# Patient Record
Sex: MALE | Race: OTHER | Employment: FULL TIME | URBAN - NONMETROPOLITAN AREA
[De-identification: names, ages, dates, MRNs, and addresses within clinical notes are randomized per-mention and may not be internally consistent; named-entity substitution may affect disease eponyms.]

---

## 2021-08-26 ENCOUNTER — OFFICE VISIT (OUTPATIENT)
Dept: FAMILY MEDICINE CLINIC | Age: 31
End: 2021-08-26
Payer: COMMERCIAL

## 2021-08-26 VITALS
DIASTOLIC BLOOD PRESSURE: 86 MMHG | TEMPERATURE: 97.3 F | RESPIRATION RATE: 16 BRPM | WEIGHT: 165.8 LBS | HEIGHT: 69 IN | BODY MASS INDEX: 24.56 KG/M2 | OXYGEN SATURATION: 98 % | HEART RATE: 86 BPM | SYSTOLIC BLOOD PRESSURE: 122 MMHG

## 2021-08-26 DIAGNOSIS — Z00.00 ENCOUNTER FOR WELLNESS EXAMINATION IN ADULT: ICD-10-CM

## 2021-08-26 DIAGNOSIS — B35.4 TINEA CORPORIS: ICD-10-CM

## 2021-08-26 DIAGNOSIS — H61.21 IMPACTED CERUMEN OF RIGHT EAR: Primary | ICD-10-CM

## 2021-08-26 PROCEDURE — 99203 OFFICE O/P NEW LOW 30 MIN: CPT | Performed by: STUDENT IN AN ORGANIZED HEALTH CARE EDUCATION/TRAINING PROGRAM

## 2021-08-26 RX ORDER — CLOTRIMAZOLE 1 %
CREAM (GRAM) TOPICAL
Qty: 1 TUBE | Refills: 1 | Status: SHIPPED | OUTPATIENT
Start: 2021-08-26 | End: 2021-09-02

## 2021-08-26 SDOH — ECONOMIC STABILITY: FOOD INSECURITY: WITHIN THE PAST 12 MONTHS, THE FOOD YOU BOUGHT JUST DIDN'T LAST AND YOU DIDN'T HAVE MONEY TO GET MORE.: NEVER TRUE

## 2021-08-26 SDOH — ECONOMIC STABILITY: FOOD INSECURITY: WITHIN THE PAST 12 MONTHS, YOU WORRIED THAT YOUR FOOD WOULD RUN OUT BEFORE YOU GOT MONEY TO BUY MORE.: NEVER TRUE

## 2021-08-26 ASSESSMENT — PATIENT HEALTH QUESTIONNAIRE - PHQ9
2. FEELING DOWN, DEPRESSED OR HOPELESS: 0
SUM OF ALL RESPONSES TO PHQ9 QUESTIONS 1 & 2: 0
SUM OF ALL RESPONSES TO PHQ QUESTIONS 1-9: 0
SUM OF ALL RESPONSES TO PHQ QUESTIONS 1-9: 0
1. LITTLE INTEREST OR PLEASURE IN DOING THINGS: 0
SUM OF ALL RESPONSES TO PHQ QUESTIONS 1-9: 0

## 2021-08-26 ASSESSMENT — ENCOUNTER SYMPTOMS
CONSTIPATION: 0
DIARRHEA: 0
COUGH: 0
ABDOMINAL PAIN: 0
EYE PAIN: 0
TROUBLE SWALLOWING: 0
BLOOD IN STOOL: 0
SHORTNESS OF BREATH: 0

## 2021-08-26 ASSESSMENT — SOCIAL DETERMINANTS OF HEALTH (SDOH): HOW HARD IS IT FOR YOU TO PAY FOR THE VERY BASICS LIKE FOOD, HOUSING, MEDICAL CARE, AND HEATING?: NOT HARD AT ALL

## 2021-08-26 NOTE — PROGRESS NOTES
Gomez Nava is a 32 y.o. male who presents today for:  Chief Complaint   Patient presents with   Darlean Romberg Doctor     trouble hearing out of Right ear, having drainage and abnormal odor to drainage started a couple weeks ago also having umbilical pain and redness started a couple days ago     HPI:   Gomez Nava is 32 y.o. who presents today for establishing care and evaluation of decreased hearing in the right ear as well as redness in his umbilicus. Hearing decreased x2 weeks, no trauma. Thinks it is due to ear wax. No past medical history. States he smokes 2 cigarettes a day, occasionally drinks 1 can of beer per night. No other concerns.  with a 5year old son and 1 week infant. Questions about COVID vaccine. Objective:     Vitals:    08/26/21 0954   BP: 122/86   Site: Right Upper Arm   Position: Sitting   Cuff Size: Medium Adult   Pulse: 86   Resp: 16   Temp: 97.3 °F (36.3 °C)   TempSrc: Temporal   SpO2: 98%   Weight: 165 lb 12.8 oz (75.2 kg)   Height: 5' 8.7\" (1.745 m)       Wt Readings from Last 3 Encounters:   08/26/21 165 lb 12.8 oz (75.2 kg)       BP Readings from Last 3 Encounters:   08/26/21 122/86       Review of Systems   Constitutional: Negative for chills, fatigue and fever. HENT: Positive for hearing loss (right ear x2 weeks). Negative for congestion, ear pain, postnasal drip and trouble swallowing. Eyes: Negative for pain and visual disturbance. Respiratory: Negative for cough and shortness of breath. Cardiovascular: Negative for chest pain and palpitations. Gastrointestinal: Negative for abdominal pain, blood in stool, constipation and diarrhea. Genitourinary: Negative for dysuria and hematuria. Skin: Negative for rash and wound. Neurological: Negative for dizziness and headaches. Psychiatric/Behavioral: The patient is not nervous/anxious. Physical Exam  Vitals and nursing note reviewed.    Constitutional:       General: He is not in acute distress. Appearance: He is well-developed. He is not diaphoretic. HENT:      Head: Normocephalic and atraumatic. Right Ear: Ear canal and external ear normal. There is impacted cerumen. Left Ear: Tympanic membrane, ear canal and external ear normal. There is no impacted cerumen. Nose: Nose normal.   Eyes:      General: No scleral icterus. Right eye: No discharge. Left eye: No discharge. Conjunctiva/sclera: Conjunctivae normal.   Cardiovascular:      Rate and Rhythm: Normal rate and regular rhythm. Heart sounds: Normal heart sounds. No murmur heard. Pulmonary:      Effort: Pulmonary effort is normal.      Breath sounds: Normal breath sounds. Abdominal:      Palpations: Abdomen is soft. Tenderness: There is no abdominal tenderness. Musculoskeletal:      Cervical back: Normal range of motion. Skin:     General: Skin is warm and dry. Findings: Lesion (red, macerated lesion 0.5 cm inside umbilicus, no surrounding erythema or discharge) present. No erythema or rash. Neurological:      Mental Status: He is alert and oriented to person, place, and time. Psychiatric:         Mood and Affect: Mood normal.         Behavior: Behavior normal.         Thought Content: Thought content normal.         Judgment: Judgment normal.           There is no immunization history on file for this patient. Health Maintenance Due   Topic Date Due    Hepatitis C screen  Never done    COVID-19 Vaccine (1) Never done    HIV screen  Never done    DTaP/Tdap/Td vaccine (1 - Tdap) Never done          Food Insecurity: No Food Insecurity    Worried About Running Out of Food in the Last Year: Never true    Ran Out of Food in the Last Year: Never true       Assessment / Plan:      Diagnosis Orders   1. Impacted cerumen of right ear     2. Tinea corporis  clotrimazole (LOTRIMIN AF) 1 % cream   3.  Encounter for wellness examination in adult  CBC Auto Differential Comprehensive Metabolic Panel     Offered flushing today - patient declined. Will do debrox OTC. F/u in 2 weeks if no improvement for flushing. Apply lotrimin to umbilicus BID until resolved. Call back if worsening or development of fever/chills. Check CBC and CMP prior to next appt       Preventative Medicine   AAA ultrasound (Male, 65-75, smoked ever) indicated at this time? Will need at age 72  Encouraged TDAP  Encouraged COVID vaccine        Return in about 2 weeks (around 9/9/2021) for f/u on hearing. Medications Prescribed:  Orders Placed This Encounter   Medications    clotrimazole (LOTRIMIN AF) 1 % cream     Sig: Apply topically 2 times daily. Dispense:  1 Tube     Refill:  1       No future appointments. Patient given educational materials - see patient instructions. Discussed use, benefit, and sideeffects of prescribed medications. All patient questions answered. Pt voiced understanding. Reviewed health maintenance. Instructed to continue current medications, diet and exercise. Patient agreed with treatment plan. Follow up as directed.      Electronically signed by Cuhn Downs DO on 8/26/2021 at 10:34 AM

## 2021-08-26 NOTE — PROGRESS NOTES
Health Maintenance Due   Topic Date Due    Hepatitis C screen  Never done    Varicella vaccine (1 of 2 - 2-dose childhood series) Never done    COVID-19 Vaccine (1) Never done    HIV screen  Never done    DTaP/Tdap/Td vaccine (1 - Tdap) Never done

## 2021-08-26 NOTE — PROGRESS NOTES
S: 32 y.o. male with   Chief Complaint   Patient presents with   Callie Valente     trouble hearing out of Right ear, having drainage and abnormal odor to drainage started a couple weeks ago also having umbilical pain and redness started a couple days ago       HPI: please see resident note for HPI and ROS. BP Readings from Last 3 Encounters:   08/26/21 122/86     Wt Readings from Last 3 Encounters:   08/26/21 165 lb 12.8 oz (75.2 kg)       O: VS:  height is 5' 8.7\" (1.745 m) and weight is 165 lb 12.8 oz (75.2 kg). His temporal temperature is 97.3 °F (36.3 °C). His blood pressure is 122/86 and his pulse is 86. His respiration is 16 and oxygen saturation is 98%. Diagnosis Orders   1. Impacted cerumen of right ear     2. Tinea corporis  clotrimazole (LOTRIMIN AF) 1 % cream   3. Encounter for wellness examination in adult  CBC Auto Differential    Comprehensive Metabolic Panel       Plan:  Start debrox at home as dir. Start lotrimin cream. Labs as ordered. Encouraged to get tdap and covid vaccines. F/u if ear not improving. 1 yr for well exam.     Health Maintenance Due   Topic Date Due    Hepatitis C screen  Never done    COVID-19 Vaccine (1) Never done    HIV screen  Never done    DTaP/Tdap/Td vaccine (1 - Tdap) Never done       Attending Physician Statement  I have discussed the case, including pertinent history and exam findings with the resident. I agree with the documented assessment and plan as documented by the resident.         Adalberto Wagoner DO 8/26/2021 10:27 AM

## 2021-08-26 NOTE — PATIENT INSTRUCTIONS
Take Debrox over the counter for softening ear wax, return if no improvement for ear flushing. Thank you   1. Thank you for trusting us with your healthcare needs. You may receive a survey regarding today's visit. It would help us out if you would take a few moments to provide your feedback. We value your input. 2. Please bring in ALL medications BOTTLES, including inhalers, herbal supplements, over the counter, prescribed & non-prescribed medicine. The office would like actual medication bottles and a list.   3. Please note our OFFICE POLICIES:   a. Prior to getting your labs drawn, please check with your insurance company for benefits and eligibility of lab services. Often, insurance companies cover certain tests for preventative visits only. It is patient's responsibility to see what is covered. b. We are unable to change a diagnosis after the test has been performed. c. Lab orders will not be re-printed. Please hold onto your original lab orders and take them to your lab to be completed. d. If you no show your scheduled appointment three times, you will be dismissed from this practice. e. Merle Bull must be completed 24 hours prior to your schedule appointment. 4. If the list below has been completed, PLEASE FAX RECORDS TO OUR OFFICE @ [phone].  Once the records have been received we will update your records at our office:  Health Maintenance Due   Topic Date Due    Hepatitis C screen  Never done    Varicella vaccine (1 of 2 - 2-dose childhood series) Never done    COVID-19 Vaccine (1) Never done    HIV screen  Never done    DTaP/Tdap/Td vaccine (1 - Tdap) Never done           Tobacco Cessation Programs     Telephonic behavior modification   1-800-QUIT-NOW (879-0874)   Counseling service for those who are ready to quit using tobacco.     Available for uninsured [place] residents, [place] recipients, pregnant women, or patients whose health plans or employers are members of the [place] [employer]    Online behavior modification   http://[place]. Quitlogix. org   Online support program to help patients through each step of the quitting process. Available 24 hours a day 7 days a week. Provides up to date researched based tool, step-by-step guides, and motivational messages. Online behavior modification   www.lungusa.org/stop-smoking/how-to-quit   HelpLine: [facility] (381-9455)   Email questions to:  Bonifacio@[email or web address] org    Website offers resources to help tobacco users figure out their reasons for quitting and then take the big step of quitting for good. Hypnosis   Location: [address] [place] [address]   Contact: Shahida Zamorano, PhD at [phone]   Hypnosis for tobacco cessation   Cost $225 for the initial session and $175 for each session afterwards. Most patients require 6-8 sessions. There is the option to submit through the patients insurance. Hypnosis and behavior modification   Location: [place] [address],  [address], [place]   Contact: Keon Hall, PhD at [phone]  [facility] Counseling and hypnosis for nicotine addition   Cost: For uninsured patients:  Please call above phone number  Cost for insured patients depends on patients insurance plan. Behavior modification   Location: [facility], [address] [place] [address]   Contact: Benito May include four one-on-one appointments between the patient and a respiratory therapist.  The four appointments span over three weeks. The respiratory therapist schedules one of the appointments to occur 48 hours after the patients quit date.  Cost $100 total for the four sessions.   Tobacco cessation products are not included in the cost and are not provided by [facility].

## 2021-09-21 ENCOUNTER — TELEPHONE (OUTPATIENT)
Dept: FAMILY MEDICINE CLINIC | Age: 31
End: 2021-09-21

## 2021-09-21 DIAGNOSIS — Z01.89 ENCOUNTER FOR LABORATORY TEST: Primary | ICD-10-CM

## 2021-09-21 NOTE — TELEPHONE ENCOUNTER
Ordered. Please call and let him know that he will need to tell them to draw all previously ordered labs (CBC, CMP) with the new tests.      [Sometimes the lab only draws the most recently ordered things.]

## 2021-09-21 NOTE — TELEPHONE ENCOUNTER
We discussed these and he declined testing previously. Can you call and ask if he wants to be checked for these? If so, let me know and I will order.

## 2022-03-27 ENCOUNTER — HOSPITAL ENCOUNTER (EMERGENCY)
Age: 32
Discharge: HOME | End: 2022-03-27
Payer: MEDICAID

## 2022-03-27 ENCOUNTER — APPOINTMENT (OUTPATIENT)
Dept: CT IMAGING | Age: 32
End: 2022-03-27
Payer: MEDICAID

## 2022-03-27 VITALS
BODY MASS INDEX: 25.76 KG/M2 | WEIGHT: 170 LBS | OXYGEN SATURATION: 97 % | SYSTOLIC BLOOD PRESSURE: 147 MMHG | DIASTOLIC BLOOD PRESSURE: 78 MMHG | RESPIRATION RATE: 18 BRPM | HEIGHT: 68 IN | HEART RATE: 97 BPM | TEMPERATURE: 98.6 F

## 2022-03-27 DIAGNOSIS — R10.13 DYSPEPSIA: ICD-10-CM

## 2022-03-27 DIAGNOSIS — R19.7 NAUSEA VOMITING AND DIARRHEA: Primary | ICD-10-CM

## 2022-03-27 DIAGNOSIS — R11.2 NAUSEA VOMITING AND DIARRHEA: Primary | ICD-10-CM

## 2022-03-27 DIAGNOSIS — K52.9 COLITIS: ICD-10-CM

## 2022-03-27 LAB
ALBUMIN SERPL-MCNC: 4.6 G/DL (ref 3.5–5.1)
ALP BLD-CCNC: 62 U/L (ref 38–126)
ALT SERPL-CCNC: 41 U/L (ref 11–66)
ANION GAP SERPL CALCULATED.3IONS-SCNC: 11 MEQ/L (ref 8–16)
AST SERPL-CCNC: 37 U/L (ref 5–40)
BACTERIA: ABNORMAL
BASOPHILS # BLD: 0.1 %
BASOPHILS ABSOLUTE: 0 THOU/MM3 (ref 0–0.1)
BILIRUB SERPL-MCNC: 1.1 MG/DL (ref 0.3–1.2)
BILIRUBIN DIRECT: < 0.2 MG/DL (ref 0–0.3)
BILIRUBIN URINE: NEGATIVE
BLOOD, URINE: ABNORMAL
BUN BLDV-MCNC: 18 MG/DL (ref 7–22)
CALCIUM SERPL-MCNC: 9.5 MG/DL (ref 8.5–10.5)
CASTS: ABNORMAL /LPF
CASTS: ABNORMAL /LPF
CHARACTER, URINE: CLEAR
CHLORIDE BLD-SCNC: 99 MEQ/L (ref 98–111)
CO2: 24 MEQ/L (ref 23–33)
COLOR: YELLOW
CREAT SERPL-MCNC: 1 MG/DL (ref 0.4–1.2)
CRYSTALS: ABNORMAL
EOSINOPHIL # BLD: 0.1 %
EOSINOPHILS ABSOLUTE: 0 THOU/MM3 (ref 0–0.4)
EPITHELIAL CELLS, UA: ABNORMAL /HPF
ERYTHROCYTE [DISTWIDTH] IN BLOOD BY AUTOMATED COUNT: 12.7 % (ref 11.5–14.5)
ERYTHROCYTE [DISTWIDTH] IN BLOOD BY AUTOMATED COUNT: 43.7 FL (ref 35–45)
GFR SERPL CREATININE-BSD FRML MDRD: 87 ML/MIN/1.73M2
GLUCOSE BLD-MCNC: 127 MG/DL (ref 70–108)
GLUCOSE, URINE: NEGATIVE MG/DL
HCT VFR BLD CALC: 47.6 % (ref 42–52)
HEMOGLOBIN: 15.8 GM/DL (ref 14–18)
IMMATURE GRANS (ABS): 0.07 THOU/MM3 (ref 0–0.07)
IMMATURE GRANULOCYTES: 0.5 %
KETONES, URINE: ABNORMAL
LEUKOCYTE ESTERASE, URINE: NEGATIVE
LIPASE: 23.3 U/L (ref 5.6–51.3)
LYMPHOCYTES # BLD: 15.2 %
LYMPHOCYTES ABSOLUTE: 2.2 THOU/MM3 (ref 1–4.8)
MCH RBC QN AUTO: 30.8 PG (ref 26–33)
MCHC RBC AUTO-ENTMCNC: 33.2 GM/DL (ref 32.2–35.5)
MCV RBC AUTO: 92.8 FL (ref 80–94)
MISCELLANEOUS LAB TEST RESULT: ABNORMAL
MONOCYTES # BLD: 2.6 %
MONOCYTES ABSOLUTE: 0.4 THOU/MM3 (ref 0.4–1.3)
NITRITE, URINE: NEGATIVE
NUCLEATED RED BLOOD CELLS: 0 /100 WBC
OSMOLALITY CALCULATION: 271.7 MOSMOL/KG (ref 275–300)
PH UA: 5.5 (ref 5–9)
PLATELET # BLD: 272 THOU/MM3 (ref 130–400)
PMV BLD AUTO: 9.9 FL (ref 9.4–12.4)
POTASSIUM SERPL-SCNC: 3.8 MEQ/L (ref 3.5–5.2)
PROTEIN UA: NEGATIVE MG/DL
RBC # BLD: 5.13 MILL/MM3 (ref 4.7–6.1)
RBC URINE: ABNORMAL /HPF
RENAL EPITHELIAL, UA: ABNORMAL
SEG NEUTROPHILS: 81.5 %
SEGMENTED NEUTROPHILS ABSOLUTE COUNT: 11.7 THOU/MM3 (ref 1.8–7.7)
SODIUM BLD-SCNC: 134 MEQ/L (ref 135–145)
SPECIFIC GRAVITY UA: 1.02 (ref 1–1.03)
TOTAL PROTEIN: 7.6 G/DL (ref 6.1–8)
UROBILINOGEN, URINE: 0.2 EU/DL (ref 0–1)
WBC # BLD: 14.3 THOU/MM3 (ref 4.8–10.8)
WBC UA: ABNORMAL /HPF
YEAST: ABNORMAL

## 2022-03-27 PROCEDURE — 96374 THER/PROPH/DIAG INJ IV PUSH: CPT

## 2022-03-27 PROCEDURE — 83690 ASSAY OF LIPASE: CPT

## 2022-03-27 PROCEDURE — 80053 COMPREHEN METABOLIC PANEL: CPT

## 2022-03-27 PROCEDURE — 74177 CT ABD & PELVIS W/CONTRAST: CPT

## 2022-03-27 PROCEDURE — 96375 TX/PRO/DX INJ NEW DRUG ADDON: CPT

## 2022-03-27 PROCEDURE — 99285 EMERGENCY DEPT VISIT HI MDM: CPT

## 2022-03-27 PROCEDURE — 85025 COMPLETE CBC W/AUTO DIFF WBC: CPT

## 2022-03-27 PROCEDURE — 6360000002 HC RX W HCPCS: Performed by: NURSE PRACTITIONER

## 2022-03-27 PROCEDURE — 6360000004 HC RX CONTRAST MEDICATION: Performed by: NURSE PRACTITIONER

## 2022-03-27 PROCEDURE — 81001 URINALYSIS AUTO W/SCOPE: CPT

## 2022-03-27 PROCEDURE — C9113 INJ PANTOPRAZOLE SODIUM, VIA: HCPCS | Performed by: NURSE PRACTITIONER

## 2022-03-27 PROCEDURE — 2580000003 HC RX 258: Performed by: NURSE PRACTITIONER

## 2022-03-27 PROCEDURE — 82248 BILIRUBIN DIRECT: CPT

## 2022-03-27 PROCEDURE — 6370000000 HC RX 637 (ALT 250 FOR IP): Performed by: NURSE PRACTITIONER

## 2022-03-27 RX ORDER — AMOXICILLIN AND CLAVULANATE POTASSIUM 875; 125 MG/1; MG/1
1 TABLET, FILM COATED ORAL 2 TIMES DAILY
Qty: 20 TABLET | Refills: 0 | Status: SHIPPED | OUTPATIENT
Start: 2022-03-27 | End: 2022-03-29

## 2022-03-27 RX ORDER — ONDANSETRON 4 MG/1
4 TABLET, ORALLY DISINTEGRATING ORAL 3 TIMES DAILY PRN
Qty: 21 TABLET | Refills: 0 | Status: SHIPPED | OUTPATIENT
Start: 2022-03-27

## 2022-03-27 RX ORDER — HYDROCODONE BITARTRATE AND ACETAMINOPHEN 5; 325 MG/1; MG/1
1 TABLET ORAL EVERY 6 HOURS PRN
Qty: 10 TABLET | Refills: 0 | Status: SHIPPED | OUTPATIENT
Start: 2022-03-27 | End: 2022-03-29

## 2022-03-27 RX ORDER — PANTOPRAZOLE SODIUM 40 MG/10ML
40 INJECTION, POWDER, LYOPHILIZED, FOR SOLUTION INTRAVENOUS ONCE
Status: COMPLETED | OUTPATIENT
Start: 2022-03-27 | End: 2022-03-27

## 2022-03-27 RX ORDER — MORPHINE SULFATE 2 MG/ML
4 INJECTION, SOLUTION INTRAMUSCULAR; INTRAVENOUS ONCE
Status: COMPLETED | OUTPATIENT
Start: 2022-03-27 | End: 2022-03-27

## 2022-03-27 RX ORDER — ONDANSETRON 2 MG/ML
4 INJECTION INTRAMUSCULAR; INTRAVENOUS ONCE
Status: COMPLETED | OUTPATIENT
Start: 2022-03-27 | End: 2022-03-27

## 2022-03-27 RX ORDER — MAGNESIUM HYDROXIDE/ALUMINUM HYDROXICE/SIMETHICONE 120; 1200; 1200 MG/30ML; MG/30ML; MG/30ML
5 SUSPENSION ORAL EVERY 6 HOURS PRN
Qty: 355 ML | Refills: 0 | Status: ON HOLD | OUTPATIENT
Start: 2022-03-27 | End: 2022-03-30 | Stop reason: HOSPADM

## 2022-03-27 RX ORDER — OMEPRAZOLE 20 MG/1
20 CAPSULE, DELAYED RELEASE ORAL
Qty: 30 CAPSULE | Refills: 0 | Status: SHIPPED | OUTPATIENT
Start: 2022-03-27

## 2022-03-27 RX ORDER — 0.9 % SODIUM CHLORIDE 0.9 %
1000 INTRAVENOUS SOLUTION INTRAVENOUS ONCE
Status: COMPLETED | OUTPATIENT
Start: 2022-03-27 | End: 2022-03-27

## 2022-03-27 RX ADMIN — IOPAMIDOL 80 ML: 755 INJECTION, SOLUTION INTRAVENOUS at 14:47

## 2022-03-27 RX ADMIN — SODIUM CHLORIDE 1000 ML: 9 INJECTION, SOLUTION INTRAVENOUS at 12:23

## 2022-03-27 RX ADMIN — MORPHINE SULFATE 4 MG: 2 INJECTION, SOLUTION INTRAMUSCULAR; INTRAVENOUS at 14:35

## 2022-03-27 RX ADMIN — ONDANSETRON 4 MG: 2 INJECTION INTRAMUSCULAR; INTRAVENOUS at 12:24

## 2022-03-27 RX ADMIN — PANTOPRAZOLE SODIUM 40 MG: 40 INJECTION, POWDER, FOR SOLUTION INTRAVENOUS at 13:54

## 2022-03-27 RX ADMIN — LIDOCAINE HYDROCHLORIDE: 20 SOLUTION ORAL; TOPICAL at 12:24

## 2022-03-27 ASSESSMENT — PAIN DESCRIPTION - LOCATION
LOCATION: ABDOMEN

## 2022-03-27 ASSESSMENT — PAIN DESCRIPTION - PAIN TYPE
TYPE: ACUTE PAIN

## 2022-03-27 ASSESSMENT — ENCOUNTER SYMPTOMS
ABDOMINAL PAIN: 1
ABDOMINAL DISTENTION: 0
ANAL BLEEDING: 0
VOMITING: 1
COLOR CHANGE: 0
BLOOD IN STOOL: 0
DIARRHEA: 1
NAUSEA: 1
RECTAL PAIN: 0
SHORTNESS OF BREATH: 0
CONSTIPATION: 0
CHEST TIGHTNESS: 0

## 2022-03-27 ASSESSMENT — PAIN DESCRIPTION - DESCRIPTORS: DESCRIPTORS: CONSTANT

## 2022-03-27 ASSESSMENT — PAIN DESCRIPTION - FREQUENCY
FREQUENCY: CONTINUOUS
FREQUENCY: CONTINUOUS

## 2022-03-27 ASSESSMENT — PAIN - FUNCTIONAL ASSESSMENT
PAIN_FUNCTIONAL_ASSESSMENT: 0-10

## 2022-03-27 ASSESSMENT — PAIN SCALES - GENERAL
PAINLEVEL_OUTOF10: 6
PAINLEVEL_OUTOF10: 4
PAINLEVEL_OUTOF10: 1
PAINLEVEL_OUTOF10: 4
PAINLEVEL_OUTOF10: 10
PAINLEVEL_OUTOF10: 6
PAINLEVEL_OUTOF10: 4

## 2022-03-27 ASSESSMENT — PAIN DESCRIPTION - ORIENTATION: ORIENTATION: UPPER;MID

## 2022-03-27 NOTE — ED TRIAGE NOTES
Pt presents to the ED through triage with c/c abdominal pain. Pt states pain started last night. Pt states that pain is 4/10 \"all over my body\". Pt states he has also been having diarrhea. Vitals stable.

## 2022-03-27 NOTE — ED NOTES
Pt called out at this time. Pt states pain has significantly increased again. Pt rates pain 6/10. Provider notified.       Fortino Moreno, RN  03/27/22 0305

## 2022-03-27 NOTE — ED NOTES
Pt reassessed at this time. Pt states pain is now 4/10. vitals stable. Resp even and unlabored.  Family at [address], RN  03/27/22 5171

## 2022-03-27 NOTE — ED NOTES
Pt reassessed at this time. Pt states that pain is now 1/10 at this time. vitals stable. Pt informed that we still need a urine sample. Pt verbalized understanding.       Tali Dumont RN  03/27/22 5119

## 2022-03-27 NOTE — ED PROVIDER NOTES
[facility] Emergency Department    CHIEF COMPLAINT       Chief Complaint   Patient presents with    Abdominal Pain       Nurses Notes reviewed and I agree except as noted in the HPI. HISTORY OF PRESENT ILLNESS    Yaya Rosenthal Patient is a 32 y.o. male who presents to the ED for evaluation of abdominal pain. Patient reports abdominal pain that started yesterday, pain is located in his epigastric area, described as a sharp pain. Reports nausea vomiting and diarrhea, also reports some burning with urination. Denies any chest pain or shortness of breath. Denies any blood in his vomit or stool. Reports taking oxycodone which has improved his pain. He denies any ill contacts, notes that he may have ate some undercooked fish. Denies any significant medical history in the past.  Denies any history of abdominal surgeries in the past.  Denies any recent out of country travel. HPI was provided by the patient. REVIEW OF SYSTEMS     Review of Systems   Constitutional: Negative for activity change, chills, fatigue and fever. Respiratory: Negative for chest tightness and shortness of breath. Cardiovascular: Negative for chest pain. Gastrointestinal: Positive for abdominal pain, diarrhea, nausea and vomiting. Negative for abdominal distention, anal bleeding, blood in stool, constipation and rectal pain. Genitourinary: Positive for dysuria. Negative for decreased urine volume, difficulty urinating, flank pain and frequency. Musculoskeletal: Negative for arthralgias and myalgias. Skin: Negative for color change and rash. Allergic/Immunologic: Negative for immunocompromised state. Neurological: Negative for dizziness, weakness, numbness and headaches. Hematological: Does not bruise/bleed easily. Psychiatric/Behavioral: Negative for agitation, behavioral problems and confusion. PAST MEDICAL HISTORY   History reviewed. No pertinent past medical history.     SURGICALHISTORY      has no past surgical history on file. CURRENT MEDICATIONS       Discharge Medication List as of 3/27/2022  3:31 PM          ALLERGIES     is allergic to blueberry flavor. FAMILY HISTORY     He indicated that his mother is alive. He indicated that his father is . family history is not on file. SOCIAL HISTORY       Social History     Socioeconomic History    Marital status: Single     Spouse name: Not on file    Number of children: Not on file    Years of education: Not on file    Highest education level: Not on file   Occupational History    Not on file   Tobacco Use    Smoking status: Current Every Day Smoker     Packs/day: 0.25     Types: Cigarettes    Smokeless tobacco: Never Used   Substance and Sexual Activity    Alcohol use: Yes     Alcohol/week: 1.0 standard drink     Types: 1 Cans of beer per week     Comment: couple times per week    Drug use: Yes     Types: Marijuana Eleniwilbur Harris)     Comment: occasional    Sexual activity: Not on file   Other Topics Concern    Not on file   Social History Narrative    Not on file     Social Determinants of Health     Financial Resource Strain: Low Risk     Difficulty of Paying Living Expenses: Not hard at all   Food Insecurity: No Food Insecurity    Worried About Running Out of Food in the Last Year: Never true    Juli of Food in the Last Year: Never true   Transportation Needs:     Lack of Transportation (Medical): Not on file    Lack of Transportation (Non-Medical):  Not on file   Physical Activity:     Days of Exercise per Week: Not on file    Minutes of Exercise per Session: Not on file   Stress:     Feeling of Stress : Not on file   Social Connections:     Frequency of Communication with Friends and Family: Not on file    Frequency of Social Gatherings with Friends and Family: Not on file    Attends Sikh Services: Not on file    Active Member of Clubs or Organizations: Not on file    Attends Club or Organization Meetings: Not on file    Marital Status: Not on file   Intimate Partner Violence:     Fear of Current or Ex-Partner: Not on file    Emotionally Abused: Not on file    Physically Abused: Not on file    Sexually Abused: Not on file   Housing Stability:     Unable to Pay for Housing in the Last Year: Not on file    Number of [place] in the Last Year: Not on file    Unstable Housing in the Last Year: Not on file       PHYSICAL EXAM     INITIAL VITALS:  height is 5' 8\" (1.727 m) and weight is 170 lb (77.1 kg). His oral temperature is 98.6 °F (37 °C). His blood pressure is 147/78 (abnormal) and his pulse is 97. His respiration is 18 and oxygen saturation is 97%. Physical Exam  Vitals and nursing note reviewed. Constitutional:       Appearance: Normal appearance. He is well-developed. HENT:      Head: Normocephalic. Right Ear: External ear normal.      Left Ear: External ear normal.      Nose: Nose normal.      Mouth/Throat:      Pharynx: Uvula midline. Eyes:      Conjunctiva/sclera: Conjunctivae normal.   Cardiovascular:      Rate and Rhythm: Normal rate and regular rhythm. Heart sounds: Normal heart sounds, S1 normal and S2 normal.   Pulmonary:      Effort: Pulmonary effort is normal. No respiratory distress. Breath sounds: Normal breath sounds. Chest:      Chest wall: No tenderness. Abdominal:      General: Bowel sounds are normal. There is no distension. Palpations: Abdomen is soft. Tenderness: There is no abdominal tenderness. There is no right CVA tenderness, left CVA tenderness, guarding or rebound. Negative signs include Oscar's sign, Rovsing's sign, McBurney's sign and psoas sign. Hernia: No hernia is present. Musculoskeletal:         General: Normal range of motion. Cervical back: Normal range of motion and neck supple. Skin:     General: Skin is warm and dry. Capillary Refill: Capillary refill takes less than 2 seconds. Coloration: Skin is not pale.       Findings: No erythema or rash. Neurological:      Mental Status: He is alert and oriented to person, place, and time. Psychiatric:         Behavior: Behavior normal.         Thought Content: Thought content normal.         Judgment: Judgment normal.         DIFFERENTIAL DIAGNOSIS:   Gastroenteritis, pancreatitis, cholecystitis, colitis, hepatitis, dehydration, electrolyte abnormality    DIAGNOSTIC RESULTS       RADIOLOGY: non-plainfilm images(s) such as CT, Ultrasound and MRI are read by the radiologist.  Plain radiographic images are visualized and preliminarily interpreted by the emergency physician unless otherwise stated below. CT ABDOMEN PELVIS W IV CONTRAST Additional Contrast? None   Final Result      1. Scattered areas of thickening with inflammatory changes in the adjacent fat involving several segments of the colon concerning for colitis. 2. Circumferential wall thickening of the urinary bladder which may be related to cystitis. 3. Hypoattenuation of the liver which may be related to steatosis. **This report has been created using voice recognition software. It may contain minor errors which are inherent in voice recognition technology. **      Final report electronically signed by Dr Isabel Ssoa on 3/27/2022 3:12 PM            LABS:   Labs Reviewed   CBC WITH AUTO DIFFERENTIAL - Abnormal; Notable for the following components:       Result Value    WBC 14.3 (*)     Segs Absolute 11.7 (*)     All other components within normal limits   BASIC METABOLIC PANEL - Abnormal; Notable for the following components:    Sodium 134 (*)     Glucose 127 (*)     All other components within normal limits   URINALYSIS WITH MICROSCOPIC - Abnormal; Notable for the following components:    Ketones, Urine TRACE (*)     Blood, Urine SMALL (*)     All other components within normal limits   GLOMERULAR FILTRATION RATE, ESTIMATED - Abnormal; Notable for the following components:    Est, Glom Filt Rate 87 (*)     All other components within normal limits   OSMOLALITY - Abnormal; Notable for the following components:    Osmolality Calc 271.7 (*)     All other components within normal limits   HEPATIC FUNCTION PANEL   LIPASE   ANION GAP       EMERGENCY DEPARTMENT COURSE:   Vitals:    Vitals:    03/27/22 1224 03/27/22 1320 03/27/22 1354 03/27/22 1454   BP: (!) 141/83 (!) 140/80 (!) 141/77 (!) 147/78   Pulse: 73 68 73 97   Resp: 18 18 18 18   Temp:       TempSrc:       SpO2: 99% 99% 99% 97%   Weight:       Height:         MDM    Patient was seen and evaluated in the emergency department, patient appeared to be in no acute distress, vital signs are reviewed, slight hypertension noted. Physical exam was completed, no significant abdominal tenderness noted, negative Oscar sign, no pain at McBurney's point, soft abdomen, active bowel sounds, no CVA tenderness. Lab work was obtained, slight leukocytosis noted. .  Trace ketones, small blood in urine, patient was treated with GI cocktail Zofran and Protonix, he was reassessed, notes continued abdominal pain, rating it a 10 out of 10. Was planning to discharge the patient but as his pain is worsened, will obtain a CT abdomen and provide morphine. CT abdomen and reviewed, possible colitis noted, slight leukocytosis, will treat with Augmentin. Discussed my findings and plan of care with the patient and his family they are verbalized understanding of plan of care. Advised return to the ER with worsening symptoms.   Medications   aluminum & magnesium hydroxide-simethicone (MAALOX) 30 mL, lidocaine viscous hcl (XYLOCAINE) 5 mL (GI COCKTAIL) ( Oral Given 3/27/22 1224)   0.9 % sodium chloride bolus (0 mLs IntraVENous Stopped 3/27/22 1323)   ondansetron (ZOFRAN) injection 4 mg (4 mg IntraVENous Given 3/27/22 1224)   pantoprazole (PROTONIX) injection 40 mg (40 mg IntraVENous Given 3/27/22 1354)   morphine (PF) injection 4 mg (4 mg IntraVENous Given 3/27/22 1435)   iopamidol (ISOVUE-370) 76 % injection 80 mL (80 mLs IntraVENous Given 3/27/22 1711)       Patient was seenindependently by myself. The patient's final impression and disposition and plan was determined by myself. CRITICAL CARE:   None    CONSULTS:  None    PROCEDURES:  None    FINAL IMPRESSION     1. Nausea vomiting and diarrhea    2. Dyspepsia    3. Colitis          DISPOSITION/PLAN   Patient discharged  PATIENT REFERREDTO:  Pawan Juan DO  [address] [facility] [address]  [phone]    Call   For follow up and evaluation    Pawan Juan DO  [address] [facility] [address]  [phone]    Call   For follow up and evaluation      DISCHARGE MEDICATIONS:  Discharge Medication List as of 3/27/2022  3:31 PM      START taking these medications    Details   omeprazole (PRILOSEC) 20 MG delayed release capsule Take 1 capsule by mouth every morning (before breakfast), Disp-30 capsule, R-0Normal      ondansetron (ZOFRAN-ODT) 4 MG disintegrating tablet Take 1 tablet by mouth 3 times daily as needed for Nausea or Vomiting, Disp-21 tablet, R-0Normal      aluminum & magnesium hydroxide-simethicone (MYLANTA) 200-200-20 MG/5ML SUSP suspension Take 5 mLs by mouth every 6 hours as needed for Indigestion, Disp-355 mL, R-0Normal      HYDROcodone-acetaminophen (NORCO) 5-325 MG per tablet Take 1 tablet by mouth every 6 hours as needed for Pain for up to 3 days. Intended supply: 3 days.  Take lowest dose possible to manage pain, Disp-10 tablet, R-0Normal      amoxicillin-clavulanate (AUGMENTIN) 875-125 MG per tablet Take 1 tablet by mouth 2 times daily for 10 days, Disp-20 tablet, R-0Normal             (Please note that portions of this note were completed with a voice recognition program.  Efforts were made to edit the dictations but occasionally words are mis-transcribed.)      Provider:  I personally performed the services described in the documentation,reviewed and edited the documentation which was dictated to the scribe in my presence, and it accurately records my words and actions.     Louie Hurst, AYAKA 03/27/22 3:42 PM    Justina Hurst, APRN - CNP        [facility], APRN - CNP  03/27/22 1543

## 2022-03-28 ENCOUNTER — HOSPITAL ENCOUNTER (OUTPATIENT)
Age: 32
Setting detail: OBSERVATION
Discharge: HOME | End: 2022-03-30
Attending: HOSPITALIST | Admitting: HOSPITALIST
Payer: MEDICAID

## 2022-03-28 ENCOUNTER — TELEPHONE (OUTPATIENT)
Dept: FAMILY MEDICINE CLINIC | Age: 32
End: 2022-03-28

## 2022-03-28 DIAGNOSIS — K62.5 COLITIS WITH RECTAL BLEEDING: ICD-10-CM

## 2022-03-28 DIAGNOSIS — A09 GASTROENTERITIS/COLITIS, INFECTIOUS: Primary | ICD-10-CM

## 2022-03-28 DIAGNOSIS — D62 ACUTE BLOOD LOSS ANEMIA: ICD-10-CM

## 2022-03-28 DIAGNOSIS — K52.9 COLITIS WITH RECTAL BLEEDING: ICD-10-CM

## 2022-03-28 PROCEDURE — 99283 EMERGENCY DEPT VISIT LOW MDM: CPT

## 2022-03-28 PROCEDURE — 36415 COLL VENOUS BLD VENIPUNCTURE: CPT

## 2022-03-28 PROCEDURE — 80048 BASIC METABOLIC PNL TOTAL CA: CPT

## 2022-03-28 PROCEDURE — 96361 HYDRATE IV INFUSION ADD-ON: CPT

## 2022-03-28 PROCEDURE — 85025 COMPLETE CBC W/AUTO DIFF WBC: CPT

## 2022-03-28 PROCEDURE — 96367 TX/PROPH/DG ADDL SEQ IV INF: CPT

## 2022-03-28 PROCEDURE — 83605 ASSAY OF LACTIC ACID: CPT

## 2022-03-28 PROCEDURE — 96366 THER/PROPH/DIAG IV INF ADDON: CPT

## 2022-03-28 PROCEDURE — 96375 TX/PRO/DX INJ NEW DRUG ADDON: CPT

## 2022-03-28 PROCEDURE — 96365 THER/PROPH/DIAG IV INF INIT: CPT

## 2022-03-28 RX ORDER — DIPHENHYDRAMINE HYDROCHLORIDE 50 MG/ML
50 INJECTION INTRAMUSCULAR; INTRAVENOUS ONCE
Status: COMPLETED | OUTPATIENT
Start: 2022-03-29 | End: 2022-03-29

## 2022-03-28 RX ORDER — ONDANSETRON 2 MG/ML
4 INJECTION INTRAMUSCULAR; INTRAVENOUS ONCE
Status: COMPLETED | OUTPATIENT
Start: 2022-03-29 | End: 2022-03-29

## 2022-03-28 RX ORDER — MORPHINE SULFATE 2 MG/ML
4 INJECTION, SOLUTION INTRAMUSCULAR; INTRAVENOUS ONCE
Status: COMPLETED | OUTPATIENT
Start: 2022-03-29 | End: 2022-03-29

## 2022-03-28 RX ORDER — 0.9 % SODIUM CHLORIDE 0.9 %
1000 INTRAVENOUS SOLUTION INTRAVENOUS ONCE
Status: COMPLETED | OUTPATIENT
Start: 2022-03-29 | End: 2022-03-29

## 2022-03-28 RX ORDER — METHYLPREDNISOLONE SODIUM SUCCINATE 125 MG/2ML
125 INJECTION, POWDER, LYOPHILIZED, FOR SOLUTION INTRAMUSCULAR; INTRAVENOUS ONCE
Status: COMPLETED | OUTPATIENT
Start: 2022-03-29 | End: 2022-03-29

## 2022-03-28 ASSESSMENT — PAIN DESCRIPTION - LOCATION: LOCATION: ABDOMEN

## 2022-03-28 ASSESSMENT — PAIN DESCRIPTION - PAIN TYPE: TYPE: ACUTE PAIN

## 2022-03-28 ASSESSMENT — PAIN - FUNCTIONAL ASSESSMENT: PAIN_FUNCTIONAL_ASSESSMENT: 0-10

## 2022-03-28 ASSESSMENT — PAIN SCALES - GENERAL: PAINLEVEL_OUTOF10: 8

## 2022-03-28 NOTE — LETTER
[address] [facility]  [address], [address]  Phone: [phone]  Fax: [phone]    March 28, 2022    [address] [place] [address]    Dear Corby Bermudez,    This letter is regarding your Emergency Department (ED) visit at [facility] on 3/27/22. Sunday Costa wanted to make sure that you understand your discharge instructions and that you were able to fill any prescriptions that may have been ordered for you. Please contact the office at the above phone number if the ED advised you to follow up with Marcusbrien Skelton, or if you have any further questions or needs. Also did you know -   *Visiting the ED for a non-emergency could result in higher co-pays than you would normally be subject to paying? *You can call your doctor even after hours so they can direct you to the most appropriate care. [facility]) practices can often offer you an appointment on the same day that you call. *We have some [facility] offices that offer Walk-in appointments; check our website for availability in your community, [email or web address].      *Evisits are now available for patients for $36 through [employer] for certain conditions:  * Sinus, cold and or cough       * Diarrhea            * Headache  * Heartburn                                * Poison Kierra          * Back pain     * Urinary problems                         If you do not have [employer] and are interested, please contact the office and a staff member may assist you or go to [email or web address].     Sincerely,   Sanju Talbot DO and your [facility]

## 2022-03-29 ENCOUNTER — APPOINTMENT (OUTPATIENT)
Dept: CT IMAGING | Age: 32
End: 2022-03-29
Payer: MEDICAID

## 2022-03-29 PROBLEM — K52.9 COLITIS WITH RECTAL BLEEDING: Status: ACTIVE | Noted: 2022-03-29

## 2022-03-29 PROBLEM — K62.5 COLITIS WITH RECTAL BLEEDING: Status: ACTIVE | Noted: 2022-03-29

## 2022-03-29 LAB
ANION GAP SERPL CALCULATED.3IONS-SCNC: 10 MEQ/L (ref 8–16)
ANION GAP SERPL CALCULATED.3IONS-SCNC: 13 MEQ/L (ref 8–16)
BASOPHILS # BLD: 0.1 %
BASOPHILS ABSOLUTE: 0 THOU/MM3 (ref 0–0.1)
BUN BLDV-MCNC: 11 MG/DL (ref 7–22)
BUN BLDV-MCNC: 8 MG/DL (ref 7–22)
CALCIUM SERPL-MCNC: 8.8 MG/DL (ref 8.5–10.5)
CALCIUM SERPL-MCNC: 9 MG/DL (ref 8.5–10.5)
CHLORIDE BLD-SCNC: 104 MEQ/L (ref 98–111)
CHLORIDE BLD-SCNC: 96 MEQ/L (ref 98–111)
CO2: 22 MEQ/L (ref 23–33)
CO2: 23 MEQ/L (ref 23–33)
CREAT SERPL-MCNC: 0.8 MG/DL (ref 0.4–1.2)
CREAT SERPL-MCNC: 1 MG/DL (ref 0.4–1.2)
EOSINOPHIL # BLD: 0.1 %
EOSINOPHILS ABSOLUTE: 0 THOU/MM3 (ref 0–0.4)
ERYTHROCYTE [DISTWIDTH] IN BLOOD BY AUTOMATED COUNT: 12.3 % (ref 11.5–14.5)
ERYTHROCYTE [DISTWIDTH] IN BLOOD BY AUTOMATED COUNT: 41.7 FL (ref 35–45)
GFR SERPL CREATININE-BSD FRML MDRD: 87 ML/MIN/1.73M2
GFR SERPL CREATININE-BSD FRML MDRD: > 90 ML/MIN/1.73M2
GLUCOSE BLD-MCNC: 148 MG/DL (ref 70–108)
GLUCOSE BLD-MCNC: 166 MG/DL (ref 70–108)
HCT VFR BLD CALC: 39.8 % (ref 42–52)
HEMOGLOBIN: 13 GM/DL (ref 14–18)
HEMOGLOBIN: 13.3 GM/DL (ref 14–18)
HEMOGLOBIN: 13.4 GM/DL (ref 14–18)
IMMATURE GRANS (ABS): 0.02 THOU/MM3 (ref 0–0.07)
IMMATURE GRANULOCYTES: 0.2 %
LACTIC ACID: 1.4 MMOL/L (ref 0.5–2)
LYMPHOCYTES # BLD: 16.2 %
LYMPHOCYTES ABSOLUTE: 1.5 THOU/MM3 (ref 1–4.8)
MAGNESIUM: 2.2 MG/DL (ref 1.6–2.4)
MCH RBC QN AUTO: 31 PG (ref 26–33)
MCHC RBC AUTO-ENTMCNC: 33.7 GM/DL (ref 32.2–35.5)
MCV RBC AUTO: 92.1 FL (ref 80–94)
MONOCYTES # BLD: 4.1 %
MONOCYTES ABSOLUTE: 0.4 THOU/MM3 (ref 0.4–1.3)
NUCLEATED RED BLOOD CELLS: 0 /100 WBC
OSMOLALITY CALCULATION: 266.7 MOSMOL/KG (ref 275–300)
PHOSPHORUS: 1.5 MG/DL (ref 2.4–4.7)
PLATELET # BLD: 216 THOU/MM3 (ref 130–400)
PMV BLD AUTO: 9.9 FL (ref 9.4–12.4)
POTASSIUM SERPL-SCNC: 3.6 MEQ/L (ref 3.5–5.2)
POTASSIUM SERPL-SCNC: 4.6 MEQ/L (ref 3.5–5.2)
RBC # BLD: 4.32 MILL/MM3 (ref 4.7–6.1)
SEG NEUTROPHILS: 79.3 %
SEGMENTED NEUTROPHILS ABSOLUTE COUNT: 7.5 THOU/MM3 (ref 1.8–7.7)
SODIUM BLD-SCNC: 132 MEQ/L (ref 135–145)
SODIUM BLD-SCNC: 136 MEQ/L (ref 135–145)
WBC # BLD: 9.4 THOU/MM3 (ref 4.8–10.8)

## 2022-03-29 PROCEDURE — 2580000003 HC RX 258: Performed by: INTERNAL MEDICINE

## 2022-03-29 PROCEDURE — 83735 ASSAY OF MAGNESIUM: CPT

## 2022-03-29 PROCEDURE — 2580000003 HC RX 258: Performed by: STUDENT IN AN ORGANIZED HEALTH CARE EDUCATION/TRAINING PROGRAM

## 2022-03-29 PROCEDURE — 6370000000 HC RX 637 (ALT 250 FOR IP): Performed by: STUDENT IN AN ORGANIZED HEALTH CARE EDUCATION/TRAINING PROGRAM

## 2022-03-29 PROCEDURE — 96361 HYDRATE IV INFUSION ADD-ON: CPT

## 2022-03-29 PROCEDURE — 80048 BASIC METABOLIC PNL TOTAL CA: CPT

## 2022-03-29 PROCEDURE — 96375 TX/PRO/DX INJ NEW DRUG ADDON: CPT

## 2022-03-29 PROCEDURE — 6360000002 HC RX W HCPCS: Performed by: NURSE PRACTITIONER

## 2022-03-29 PROCEDURE — 2500000003 HC RX 250 WO HCPCS: Performed by: NURSE PRACTITIONER

## 2022-03-29 PROCEDURE — G0378 HOSPITAL OBSERVATION PER HR: HCPCS

## 2022-03-29 PROCEDURE — 84100 ASSAY OF PHOSPHORUS: CPT

## 2022-03-29 PROCEDURE — 99225 PR SBSQ OBSERVATION CARE/DAY 25 MINUTES: CPT | Performed by: INTERNAL MEDICINE

## 2022-03-29 PROCEDURE — 36415 COLL VENOUS BLD VENIPUNCTURE: CPT

## 2022-03-29 PROCEDURE — 2500000003 HC RX 250 WO HCPCS: Performed by: INTERNAL MEDICINE

## 2022-03-29 PROCEDURE — 96366 THER/PROPH/DIAG IV INF ADDON: CPT

## 2022-03-29 PROCEDURE — 96365 THER/PROPH/DIAG IV INF INIT: CPT

## 2022-03-29 PROCEDURE — 96367 TX/PROPH/DG ADDL SEQ IV INF: CPT

## 2022-03-29 PROCEDURE — 85018 HEMOGLOBIN: CPT

## 2022-03-29 PROCEDURE — 2580000003 HC RX 258: Performed by: NURSE PRACTITIONER

## 2022-03-29 RX ORDER — ACETAMINOPHEN 650 MG/1
650 SUPPOSITORY RECTAL EVERY 6 HOURS PRN
Status: DISCONTINUED | OUTPATIENT
Start: 2022-03-29 | End: 2022-03-30 | Stop reason: HOSPADM

## 2022-03-29 RX ORDER — POTASSIUM CHLORIDE 20 MEQ/1
40 TABLET, EXTENDED RELEASE ORAL ONCE
Status: COMPLETED | OUTPATIENT
Start: 2022-03-29 | End: 2022-03-29

## 2022-03-29 RX ORDER — PANTOPRAZOLE SODIUM 40 MG/1
40 TABLET, DELAYED RELEASE ORAL
Status: DISCONTINUED | OUTPATIENT
Start: 2022-03-29 | End: 2022-03-30 | Stop reason: HOSPADM

## 2022-03-29 RX ORDER — ONDANSETRON 4 MG/1
4 TABLET, ORALLY DISINTEGRATING ORAL 3 TIMES DAILY PRN
Status: DISCONTINUED | OUTPATIENT
Start: 2022-03-29 | End: 2022-03-29 | Stop reason: SDUPTHER

## 2022-03-29 RX ORDER — POLYETHYLENE GLYCOL 3350 17 G/17G
17 POWDER, FOR SOLUTION ORAL DAILY PRN
Status: DISCONTINUED | OUTPATIENT
Start: 2022-03-29 | End: 2022-03-30 | Stop reason: HOSPADM

## 2022-03-29 RX ORDER — SODIUM CHLORIDE 9 MG/ML
INJECTION, SOLUTION INTRAVENOUS CONTINUOUS
Status: ACTIVE | OUTPATIENT
Start: 2022-03-29 | End: 2022-03-29

## 2022-03-29 RX ORDER — MAGNESIUM HYDROXIDE/ALUMINUM HYDROXICE/SIMETHICONE 120; 1200; 1200 MG/30ML; MG/30ML; MG/30ML
5 SUSPENSION ORAL EVERY 6 HOURS PRN
Status: DISCONTINUED | OUTPATIENT
Start: 2022-03-29 | End: 2022-03-30 | Stop reason: HOSPADM

## 2022-03-29 RX ORDER — ONDANSETRON 4 MG/1
4 TABLET, ORALLY DISINTEGRATING ORAL EVERY 8 HOURS PRN
Status: DISCONTINUED | OUTPATIENT
Start: 2022-03-29 | End: 2022-03-30 | Stop reason: HOSPADM

## 2022-03-29 RX ORDER — CIPROFLOXACIN 2 MG/ML
400 INJECTION, SOLUTION INTRAVENOUS ONCE
Status: COMPLETED | OUTPATIENT
Start: 2022-03-29 | End: 2022-03-29

## 2022-03-29 RX ORDER — ONDANSETRON 2 MG/ML
4 INJECTION INTRAMUSCULAR; INTRAVENOUS EVERY 6 HOURS PRN
Status: DISCONTINUED | OUTPATIENT
Start: 2022-03-29 | End: 2022-03-30 | Stop reason: HOSPADM

## 2022-03-29 RX ORDER — SODIUM CHLORIDE 0.9 % (FLUSH) 0.9 %
10 SYRINGE (ML) INJECTION PRN
Status: DISCONTINUED | OUTPATIENT
Start: 2022-03-29 | End: 2022-03-30 | Stop reason: HOSPADM

## 2022-03-29 RX ORDER — ACETAMINOPHEN 325 MG/1
650 TABLET ORAL EVERY 6 HOURS PRN
Status: DISCONTINUED | OUTPATIENT
Start: 2022-03-29 | End: 2022-03-30 | Stop reason: HOSPADM

## 2022-03-29 RX ORDER — SODIUM CHLORIDE 9 MG/ML
INJECTION, SOLUTION INTRAVENOUS PRN
Status: DISCONTINUED | OUTPATIENT
Start: 2022-03-29 | End: 2022-03-30 | Stop reason: HOSPADM

## 2022-03-29 RX ORDER — SODIUM CHLORIDE 0.9 % (FLUSH) 0.9 %
5-40 SYRINGE (ML) INJECTION EVERY 12 HOURS SCHEDULED
Status: DISCONTINUED | OUTPATIENT
Start: 2022-03-29 | End: 2022-03-30 | Stop reason: HOSPADM

## 2022-03-29 RX ADMIN — DIPHENHYDRAMINE HYDROCHLORIDE 50 MG: 50 INJECTION INTRAMUSCULAR; INTRAVENOUS at 00:20

## 2022-03-29 RX ADMIN — METRONIDAZOLE 500 MG: 500 INJECTION, SOLUTION INTRAVENOUS at 01:47

## 2022-03-29 RX ADMIN — SODIUM PHOSPHATE, MONOBASIC, MONOHYDRATE 20 MMOL: 276; 142 INJECTION, SOLUTION INTRAVENOUS at 12:54

## 2022-03-29 RX ADMIN — ALUMINUM HYDROXIDE, MAGNESIUM HYDROXIDE, AND SIMETHICONE 5 ML: 200; 200; 20 SUSPENSION ORAL at 22:59

## 2022-03-29 RX ADMIN — MORPHINE SULFATE 4 MG: 2 INJECTION, SOLUTION INTRAMUSCULAR; INTRAVENOUS at 00:22

## 2022-03-29 RX ADMIN — CIPROFLOXACIN 400 MG: 2 INJECTION, SOLUTION INTRAVENOUS at 02:54

## 2022-03-29 RX ADMIN — SODIUM CHLORIDE 1000 ML: 9 INJECTION, SOLUTION INTRAVENOUS at 00:17

## 2022-03-29 RX ADMIN — SODIUM CHLORIDE, PRESERVATIVE FREE 10 ML: 5 INJECTION INTRAVENOUS at 20:10

## 2022-03-29 RX ADMIN — METHYLPREDNISOLONE SODIUM SUCCINATE 125 MG: 125 INJECTION, POWDER, FOR SOLUTION INTRAMUSCULAR; INTRAVENOUS at 00:19

## 2022-03-29 RX ADMIN — PANTOPRAZOLE SODIUM 40 MG: 40 TABLET, DELAYED RELEASE ORAL at 06:09

## 2022-03-29 RX ADMIN — SODIUM CHLORIDE: 9 INJECTION, SOLUTION INTRAVENOUS at 02:56

## 2022-03-29 RX ADMIN — ONDANSETRON 4 MG: 2 INJECTION INTRAMUSCULAR; INTRAVENOUS at 00:18

## 2022-03-29 RX ADMIN — POTASSIUM CHLORIDE 40 MEQ: 1500 TABLET, EXTENDED RELEASE ORAL at 04:00

## 2022-03-29 ASSESSMENT — PAIN SCALES - GENERAL
PAINLEVEL_OUTOF10: 0
PAINLEVEL_OUTOF10: 10
PAINLEVEL_OUTOF10: 4
PAINLEVEL_OUTOF10: 0
PAINLEVEL_OUTOF10: 8

## 2022-03-29 ASSESSMENT — PAIN DESCRIPTION - LOCATION
LOCATION: ABDOMEN

## 2022-03-29 ASSESSMENT — PAIN DESCRIPTION - DESCRIPTORS: DESCRIPTORS: PRESSURE;ACHING

## 2022-03-29 ASSESSMENT — PAIN DESCRIPTION - PAIN TYPE
TYPE: ACUTE PAIN

## 2022-03-29 ASSESSMENT — PAIN DESCRIPTION - PROGRESSION: CLINICAL_PROGRESSION: GRADUALLY IMPROVING

## 2022-03-29 ASSESSMENT — PAIN DESCRIPTION - ORIENTATION: ORIENTATION: UPPER;MID

## 2022-03-29 ASSESSMENT — PAIN - FUNCTIONAL ASSESSMENT: PAIN_FUNCTIONAL_ASSESSMENT: 0-10

## 2022-03-29 NOTE — ED NOTES
Pt medicated per MAR. Pt tolerated well. Respirations unlabored. Lights off for comfort.       Sae Clarke RN  03/29/22 3480

## 2022-03-29 NOTE — ED NOTES
Pt to ER with complaints of a possible allergic reaction. He has a rash developing on whole body. Pt was seen here yesterday and received multiple medications. He is unsure of which one would've caused the reaction.       Christina Brito RN  03/28/22 9969

## 2022-03-29 NOTE — H&P
HOSPITALIST H&P NOTE    Patient Peggy Matos   MRN -  [id number]   Allyson # - [de-identified]   - 1990      Date of Admission -  3/28/2022 11:11 PM  Date of evaluation -  3/29/2022  Room - [address] [place] [address] Day - 0  Primary Care Physician - Felecia Heck DO   Code Status: Full Code    Chief Complaint:    Hives, abdominal cramps     History Of Presenting Illness:     Saad Bwoling is a 32 y.o. male with a past medical history of recreational marijuana smoking this [facility] ED on 2022 for evaluation of sudden onset of hives that have appeared on his abdomen and arms and thorax. Patient reports onset was this morning when he first woke up and noticed the hives. Lesions have significantly improved since. Believed to be due to newly prescribed Augmentin in the ED here on  for infectious colitis. During that encounter, patient presented with nausea, vomiting, bloody diarrhea. CT abdomen and pelvis at the time revealed distal colonic thickening consistent with colitis. Thus, he was subsequently discharged home on Augmentin. Since then, it was noted that his Hgb has dropped 2.5 points he continues to have bloody diarrhea. He reports a total of 3 episodes in the last 24 hours, and marked improvement since beginning of symptom onset. However, because he continues to have bloody diarrhea, patient was admitted for observation with close monitoring of his hemoglobin. At this time, patient has no complaints. Denies any abdominal pain, chest pain, headache, shortness of breath, numbness, tingling, vomiting, nausea. Past Medical History    History reviewed. No pertinent past medical history.    Medications    Current Medications    pantoprazole  40 mg Oral QAM AC    sodium chloride flush  5-40 mL IntraVENous 2 times per day     aluminum & magnesium hydroxide-simethicone, sodium chloride flush, sodium chloride, ondansetron **OR** ondansetron, polyethylene glycol, acetaminophen **OR** acetaminophen  IV Drips/Infusions   sodium chloride      sodium chloride 100 mL/hr at 03/29/22 0581     Home Medications  Medications Prior to Admission: omeprazole (PRILOSEC) 20 MG delayed release capsule, Take 1 capsule by mouth every morning (before breakfast)  ondansetron (ZOFRAN-ODT) 4 MG disintegrating tablet, Take 1 tablet by mouth 3 times daily as needed for Nausea or Vomiting  aluminum & magnesium hydroxide-simethicone (MYLANTA) 200-200-20 MG/5ML SUSP suspension, Take 5 mLs by mouth every 6 hours as needed for Indigestion    Diet    ADULT DIET; Regular    Review of Systems:   Constitutional:  No Weight Change, No Fever, No Chills, No Night Sweats, No Fatigue, No Malaise   ENT/Mouth:  No Ear Pain, No Nasal Congestion, No Sinus Pain, No Hoarseness, No sore throat, No Rhinorrhea, No Swallowing Difficulty   Eyes:  No Eye Pain, No Swelling, No Redness, No Foreign Body, No Discharge, No Vision Changes   Cardiovascular:  No Chest Pain, No Palpitations   Respiratory:  No Cough, No Sputum, No Wheezing, No SOB, No Dyspnea on Exertion, No Orthopnea,  Gastrointestinal:  No Nausea, +Vomiting, +Diarrhea, No Constipation, No Pain, No Heartburn, No Anorexia, No Dysphagia, No Hematochezia, No Flatulence  Genitourinary:  No Dysmenorrhea, No Dyspareunia, No Dysuria, No Urinary Frequency, No Hematuria, No Urinary Incontinence, No Urgency, No Flank Pain, No Urinary Flow Changes  Musculoskeletal:  No Arthralgias, No Myalgias, No Joint Swelling, No Joint Stiffness, No Back Pain, No Neck Pain  Skin:  +Skin Lesions, +Pruritis, No Hair Changes, No Breast/Skin Changes, No Nipple Discharge   Neuro:  No Weakness, No Numbness, No Paresthesias, No Loss of Consciousness, No Syncope, No Dizziness, No Headache, No Coordination Changes, No Recent Falls   Heme/Lymph:  No Bruising, No Bleeding, No Lymphadenopathy   Endocrine:  No Polyuria, No Polydipsia, No Temperature Intolerance    Vitals     height is 5' 8\" (1.727 m) and weight is 170 lb (77.1 kg).  His oral temperature is 98.4 °F (36.9 °C). His blood pressure is 136/75 and his pulse is 81. His respiration is 18 and oxygen saturation is 99%. Body mass index is 25.85 kg/m². Input/Output     Intake/Output Summary (Last 24 hours) at 3/29/2022 0555  Last data filed at 3/29/2022 0554  Gross per 24 hour   Intake 829.1 ml   Output --   Net 829.1 ml     No intake/output data recorded. Patient Vitals for the past 96 hrs (Last 3 readings):   Weight   03/28/22 2314 170 lb (77.1 kg)     Physical Exam   GENERAL APPEARANCE: Well developed, well nourished, alert & cooperative, and appears to be in NAD  EYES: PERRL, EOMI, no conjunctivitis, no erythema, no discharge. HENT: normocephalic head, hearing grossly intact, no nasal discharge, oral cavity & pharynx free of inflammation, swelling, exudate, or lesions. Neck supple, non-tender without lymphadenopathy, masses or thyromegaly. CARDIAC: RRR, normal S1 and S2. No S3, S4, no m/r/g, no peripheral edema, cyanosis or pallor. Extremities warm & well perfused. Capillary refill < 2 seconds. No carotid bruits. LUNGS: CTA b/l, no rales, rhonchi, wheezing or diminished breath sounds, non-pursing lips, no cyanosis  ABDOMEN: NABS, soft, nondistended, nontender, without guarding or rebound, no masses noted   MUSKULOSKELETAL: No weakness. Strength 5/5 in all extremities, ROM symmetric and intact, no joint erythema or tenderness. Normal muscular development. EXTREMITIES: No significant deformity or joint abnormality, no edema, peripheral pulses intact 2/4, no varicosities. NEUROLOGICAL: CN II-XII intact, strength and sensation symmetric and intact throughout.  Cerebellar function intact  SKIN: Skin normal color, texture and turgor with no lesions or eruptions, purpuritic flat ecchymotic and non-blanching patches throughout extremities   PSYCHIATRIC: AOx3, able to demonstrate good judgement & reason    Labs   ABG  No results found for: PH, PO2, PCO2, HCO3, O2SAT  No results found for: Pamela Stiles, RADHAEP  CBC  Recent Labs     03/27/22  1200 03/28/22  2355   WBC 14.3* 9.4   RBC 5.13 4.32*   HGB 15.8 13.4*   HCT 47.6 39.8*   MCV 92.8 92.1   MCH 30.8 31.0   MCHC 33.2 33.7    216   MPV 9.9 9.9      BMP  Recent Labs     03/27/22  1200 03/28/22  2355   * 132*   K 3.8 3.6   CL 99 96*   CO2 24 23   BUN 18 11   CREATININE 1.0 1.0   GLUCOSE 127* 148*   CALCIUM 9.5 8.8     LFT  Recent Labs     03/27/22  1200   AST 37   ALT 41   BILITOT 1.1   ALKPHOS 62   LIPASE 23.3     TROP  No results found for: TROPONINT  BNP  No results for input(s): BNP in the last 72 hours. Lactic Acid  Recent Labs     03/28/22  2355   LACTA 1.4     INR  No results for input(s): INR, PROTIME in the last 72 hours. PTT  No results for input(s): APTT in the last 72 hours. Glucose  No results for input(s): POCGLU in the last 72 hours. UA   Recent Labs     03/27/22  1330   SPECGRAV 1.018   PHUR 5.5   COLORU YELLOW   PROTEINU NEGATIVE   BLOODU SMALL*   RBCUA 3-5   WBCUA 0-2   BACTERIA NONE SEEN   NITRU NEGATIVE   BILIRUBINUR NEGATIVE   UROBILINOGEN 0.2   KETUA TRACE*   LABCAST NONE SEEN  NONE SEEN   . Microbiology   Stool panel pending     Problem List      Active Hospital Problems    Diagnosis Date Noted    Colitis with rectal bleeding [K52.9, K62.5] 03/29/2022      Assessment & Plan:   1. Acute Infectious Colitis: Presented initially on 03/27 with nausea, vomiting, bloody diarrhea. Seen on CT abdomen on previous ED visit on 03/27/22. Was sent home with Augmentin from which he developed hives and diffuse pruritis. Hemodynamically stable. Patient reports improvement. - 1 dose Flagyll & Cipro given in ED.    - Continue supportive care with IVF, zofran prn    - Discontinue augmentin, no indication for abx at this time   - Stool panel pending   2. Hematochezia: Onset 2 days ago 2/2 infectious colitis above. 3 episodes in the last 24 hrs. 2.5 Hgb drop in 24 hours.     - Started on protonix in ED    - Monitor Hgb q8h    - Nursing to monitor stool for color and consistency   - Strict I/Os    3. Acute Allergic Dermatitis: Patient woke up with hives morning of 03/29/22 after being sent home from ED with Augmentin for his colitis. Markedly improved since then per patient. - Discontinue augmentin  4.  Marijuana Smoker: Smokes recreationally on a nearly daily basis     VTE prophylaxis: [] Lovenox                                 [] SCDs                                 [] SQ Heparin                                 [x] Encourage ambulation           [] Already on Anticoagulation    IV Fluids: 75 cc/hr NS  Diet: Regular    Code Status: Full Code  Disposition: Later today if Hgb stable     Tele:   [] yes             [x] no    Electronically signed by   Juvenal Ashton DO on 3/29/2022 at 5:55 AM

## 2022-03-29 NOTE — ED NOTES
ED nurse-to-nurse bedside report    Chief Complaint   Patient presents with    Allergic Reaction      LOC: alert and orientated to name, place, date  Vital signs   Vitals:    03/28/22 2314 03/29/22 0013 03/29/22 0030   BP: (!) 165/104 (!) 138/90 (!) 146/79   Pulse: 90 82 85   Resp: 18 16 16   Temp: 98.8 °F (37.1 °C)     TempSrc: Oral     SpO2: 100% 100% 100%   Weight: 170 lb (77.1 kg)     Height: 5' 8\" (1.727 m)        Pain:    Pain Interventions: morphine  Pain Goal: 4  Oxygen: No    Current needs required room air   Telemetry: No  LDAs:   Peripheral IV 03/29/22 Right Antecubital (Active)     Continuous Infusions:   Mobility: Independent  English Fall Risk Score: No flowsheet data found. Fall Interventions: call light within reach  Report given to:  Slade Oliveros [address] [place] [address], RN  03/29/22 0936

## 2022-03-29 NOTE — ED NOTES
Pt resting on cot at this time. No needs voiced. Will continue to monitor.       Maranda Hernandes RN  03/29/22 0479

## 2022-03-29 NOTE — CARE COORDINATION
3/29/22, 8:11 AM EDT  DISCHARGE PLANNING EVALUATION:    Gianluca Lozada       [facility]   Location: 4A-19/019-A Reason for admit: Gastroenteritis/colitis, infectious [A09]  Colitis with rectal bleeding [K52.9, K62.5]   PMH:  has no past medical history on file. Procedure:   CT Abdomen Pelvis W IV Contrast pending  Barriers to Discharge:  From ED. Hgb 13.3, regular diet, IV fluids, pain and nausea control, Protonix. PCP: Porter Crawley, DO   %    Patient Goals/Plan/Treatment Preferences: Met with Yaya. He currently lives at home with his girlfriend and child. Plan us to return home at discharge. He denies need for DME and declines HH. Kalia Fry from [employer] notified of no payor source. She will follow. Transportation/Food Security/Housekeeping Addressed:  No issues identified.

## 2022-03-29 NOTE — PROGRESS NOTES
Progress Note    Patient:  Ranjeet Terry    Unit/Bed:4A-19/019-A  YOB: 1990  MRN: [id number]   Acct: [de-identified]   Admit date: 3/28/2022      Principal Problem:    Colitis with rectal bleeding  Resolved Problems:    * No resolved hospital problems. *          Assessment and Plan:  1. Infectious colitis, acute, patient will continue Flagyl and ciprofloxacin, will require outpatient GI follow-up to evaluate any ulcerative colitis and/or Crohn's disease although my index suspicion for this is low he would likely require colonoscopy after inflammation and hematochezia resolves within the next 2 to 4 weeks as inflammation hematochezia will resolve within the next several days he will continue ciprofloxacin and Flagyl on discharge anticipate discharge in a.m. 3/30/2022 I discussed this plan of care with the patient is agreement  2. Hematochezia no further episodes of hematochezia reported thus far however he has not had a bowel movement  3. Acute blood loss anemia likely secondary to infectious colitis this patient is only eligible for transfusion should his hemoglobin drop by more than 4 g/dL and/or have a hemoglobin less than 7 g/dL  4. Allergic dermatitis patient must have allergy listed in future as Augmentin  5. Hyperglycemia likely stress hyperglycemia will not require sliding scale insulin        Patient Seen, Chart, Consults notes, Labs, Radiology studies reviewed.     Subjective: No acute decompensation overnight the patient is comfortable on physical survey this morning, his allergic dermatitis and/or hives appear to have resolved he denies having any bowel movement overnight or in the morning he appears to be hospitalized for what is suspected to be infectious colitis and hematochezia with acute blood loss anemia at the time my evaluation he is comfortable tolerating p.o. intake no nausea vomiting reported he has modest abdominal discomfort with ambulating on physical input(s): CHOL, TRIG, HDL, LDL, LDLCALC in the last 72 hours. ABGs: No results found for: PH, PCO2, PO2, HCO3, O2SAT        Radiology reports as per the Radiologist  Radiology: CT ABDOMEN PELVIS W IV CONTRAST Additional Contrast? None    Result Date: 3/27/2022  PROCEDURE: CT ABDOMEN PELVIS W IV CONTRAST CLINICAL INFORMATION: 49-year-old male with epigastric/mid abdominal pain . COMPARISON: None. TECHNIQUE: 5 mm axial CT images were obtained through the abdomen and pelvis after the administration of intravenous and oral contrast. Coronal and sagittal reconstructions were obtained. All CT scans at this facility use dose modulation, iterative reconstruction, and/or weight-based dosing when appropriate to reduce radiation dose to as low as reasonably achievable. FINDINGS: The lung bases are clear. There is no pleural effusion. The base of the heart is within acceptable limits. The liver is somewhat hypoattenuated which may be related to fatty infiltration. The gallbladder, spleen, pancreas and adrenal glands are within normal limits. There is a cyst arising from the left kidney. There is no hydronephrosis. There is no evidence of a small bowel obstruction. There is thickening throughout several portions of the colon including the ascending colon, hepatic flexure, descending colon and sigmoid colon. There is circumferential thickening of the urinary bladder wall. The aorta and the IVC are normal in caliber. There is no ascites. No free air. The bones are intact. 1. Scattered areas of thickening with inflammatory changes in the adjacent fat involving several segments of the colon concerning for colitis. 2. Circumferential wall thickening of the urinary bladder which may be related to cystitis. 3. Hypoattenuation of the liver which may be related to steatosis. **This report has been created using voice recognition software. It may contain minor errors which are inherent in voice recognition technology. ** Final report electronically signed by Dr Natalie Mckeon on 3/27/2022 3:12 PM        Physical Exam:  Vitals: /70   Pulse 88   Temp 98.4 °F (36.9 °C) (Oral)   Resp 16   Ht 5' 8\" (1.727 m)   Wt 170 lb (77.1 kg)   SpO2 99%   BMI 25.85 kg/m²   24 hour intake/output:    Intake/Output Summary (Last 24 hours) at 3/29/2022 1348  Last data filed at 3/29/2022 0554  Gross per 24 hour   Intake 829.1 ml   Output --   Net 829.1 ml     Last 3 weights: Wt Readings from Last 3 Encounters:   03/28/22 170 lb (77.1 kg)   03/27/22 170 lb (77.1 kg)   08/26/21 165 lb 12.8 oz (75.2 kg)       General appearance - alert, awake appears to be in no acute distress  HEENT: Atraumatic normocephalic, no JVD. Trachea midline.    Chest - Bilateral air entry, no wheezes, crackles or rhonchi  Cardiovascular - S1S2 RRR, no murmurs or gallops  Abdomen - Soft non tender non distended, normoactive bowel sounds   Neurological - non focal, no neurological deficits noted  Integumentary - Skin color, texture, turgor normal. No Rashes or lesions  Musculoskeletal -Full ROM, No clubbing or cyanosis  Extremities: No peripheral edema    DVT prophylaxis: [] Lovenox                                 [] SCDs                                 [] SQ Heparin                                 [x] Encourage ambulation           [] Already on Anticoagulation               Electronically signed by Saadia Gunderson MD on 3/29/2022 at 1:48 PM    [facility]

## 2022-03-29 NOTE — ED NOTES
Pt c/o abdominal pain and continuously hitting call light asking nurses for pain medication. Pt was dx with colitis yesterday and prescribed pain medication to take at home. RN explained she is unable to provide pain medicine without an order from a provider and they would be in to see pt as soon as they could.      Ania Fish RN  03/28/22 1978

## 2022-03-30 VITALS
SYSTOLIC BLOOD PRESSURE: 143 MMHG | WEIGHT: 170.2 LBS | RESPIRATION RATE: 16 BRPM | OXYGEN SATURATION: 99 % | DIASTOLIC BLOOD PRESSURE: 93 MMHG | TEMPERATURE: 98.2 F | HEIGHT: 68 IN | HEART RATE: 92 BPM | BODY MASS INDEX: 25.79 KG/M2

## 2022-03-30 PROCEDURE — 6360000002 HC RX W HCPCS: Performed by: STUDENT IN AN ORGANIZED HEALTH CARE EDUCATION/TRAINING PROGRAM

## 2022-03-30 PROCEDURE — G0378 HOSPITAL OBSERVATION PER HR: HCPCS

## 2022-03-30 PROCEDURE — 6370000000 HC RX 637 (ALT 250 FOR IP): Performed by: STUDENT IN AN ORGANIZED HEALTH CARE EDUCATION/TRAINING PROGRAM

## 2022-03-30 PROCEDURE — 96376 TX/PRO/DX INJ SAME DRUG ADON: CPT

## 2022-03-30 PROCEDURE — 2580000003 HC RX 258: Performed by: STUDENT IN AN ORGANIZED HEALTH CARE EDUCATION/TRAINING PROGRAM

## 2022-03-30 PROCEDURE — 6360000002 HC RX W HCPCS

## 2022-03-30 PROCEDURE — 99217 PR OBSERVATION CARE DISCHARGE MANAGEMENT: CPT | Performed by: INTERNAL MEDICINE

## 2022-03-30 RX ORDER — MORPHINE SULFATE 4 MG/ML
4 INJECTION, SOLUTION INTRAMUSCULAR; INTRAVENOUS
Status: DISCONTINUED | OUTPATIENT
Start: 2022-03-30 | End: 2022-03-30 | Stop reason: HOSPADM

## 2022-03-30 RX ORDER — HYDROCODONE BITARTRATE AND ACETAMINOPHEN 5; 325 MG/1; MG/1
1 TABLET ORAL EVERY 8 HOURS PRN
Qty: 9 TABLET | Refills: 0 | Status: SHIPPED | OUTPATIENT
Start: 2022-03-30 | End: 2022-04-02

## 2022-03-30 RX ORDER — ONDANSETRON 4 MG/1
4 TABLET, FILM COATED ORAL 3 TIMES DAILY PRN
Qty: 15 TABLET | Refills: 0 | Status: SHIPPED | OUTPATIENT
Start: 2022-03-30

## 2022-03-30 RX ORDER — DIPHENHYDRAMINE HYDROCHLORIDE 50 MG/ML
INJECTION INTRAMUSCULAR; INTRAVENOUS
Status: COMPLETED
Start: 2022-03-30 | End: 2022-03-30

## 2022-03-30 RX ORDER — MORPHINE SULFATE 2 MG/ML
2 INJECTION, SOLUTION INTRAMUSCULAR; INTRAVENOUS
Status: DISCONTINUED | OUTPATIENT
Start: 2022-03-30 | End: 2022-03-30 | Stop reason: HOSPADM

## 2022-03-30 RX ORDER — DIPHENHYDRAMINE HYDROCHLORIDE 50 MG/ML
25 INJECTION INTRAMUSCULAR; INTRAVENOUS EVERY 6 HOURS PRN
Status: DISCONTINUED | OUTPATIENT
Start: 2022-03-30 | End: 2022-03-30 | Stop reason: HOSPADM

## 2022-03-30 RX ORDER — MORPHINE SULFATE 4 MG/ML
INJECTION, SOLUTION INTRAMUSCULAR; INTRAVENOUS
Status: COMPLETED
Start: 2022-03-30 | End: 2022-03-30

## 2022-03-30 RX ORDER — METRONIDAZOLE 500 MG/1
500 TABLET ORAL 2 TIMES DAILY
Qty: 14 TABLET | Refills: 0 | Status: SHIPPED | OUTPATIENT
Start: 2022-03-30 | End: 2022-04-06

## 2022-03-30 RX ADMIN — PANTOPRAZOLE SODIUM 40 MG: 40 TABLET, DELAYED RELEASE ORAL at 07:44

## 2022-03-30 RX ADMIN — MORPHINE SULFATE 4 MG: 4 INJECTION, SOLUTION INTRAMUSCULAR; INTRAVENOUS at 00:26

## 2022-03-30 RX ADMIN — SODIUM CHLORIDE, PRESERVATIVE FREE 10 ML: 5 INJECTION INTRAVENOUS at 08:01

## 2022-03-30 RX ADMIN — DIPHENHYDRAMINE HYDROCHLORIDE 25 MG: 50 INJECTION INTRAMUSCULAR; INTRAVENOUS at 00:26

## 2022-03-30 RX ADMIN — DIPHENHYDRAMINE HYDROCHLORIDE 25 MG: 50 INJECTION INTRAMUSCULAR; INTRAVENOUS at 08:00

## 2022-03-30 RX ADMIN — ONDANSETRON 4 MG: 2 INJECTION INTRAMUSCULAR; INTRAVENOUS at 00:25

## 2022-03-30 RX ADMIN — SODIUM CHLORIDE, PRESERVATIVE FREE 10 ML: 5 INJECTION INTRAVENOUS at 08:33

## 2022-03-30 ASSESSMENT — PAIN SCALES - GENERAL
PAINLEVEL_OUTOF10: 0
PAINLEVEL_OUTOF10: 4
PAINLEVEL_OUTOF10: 10
PAINLEVEL_OUTOF10: 0

## 2022-03-30 ASSESSMENT — PAIN DESCRIPTION - DESCRIPTORS: DESCRIPTORS: PRESSURE;ACHING

## 2022-03-30 ASSESSMENT — PAIN DESCRIPTION - PAIN TYPE: TYPE: ACUTE PAIN

## 2022-03-30 ASSESSMENT — PAIN DESCRIPTION - LOCATION: LOCATION: ABDOMEN

## 2022-03-30 ASSESSMENT — PAIN DESCRIPTION - ORIENTATION: ORIENTATION: UPPER;MID

## 2022-03-30 NOTE — PROGRESS NOTES
Pt A&O x4 speech is clear and appropriate. Pupils are round and reactant to light. Upper extremities are warm dry and tan with signs of skin irritation. Hand grasp is strong and equal bilat. Cap refill and skin turgor are less than 3 seconds. Lung sounds are clear through out and chest movements are of moderate depth. Heart sounds are of normal rate and rhythm. Bowl sounds are active x4. Lower extremities are warm tan dry. Pedal push and pull are strong and equal bilat. Pt stated \" I itch all over my body, my tongue is itchy too\" PRN antihistimine was given. Bed returned to the lowest position with call light in reach.    Rose Yeung 41 NS

## 2022-03-30 NOTE — PROGRESS NOTES
Pt resting comfortably in bed. Bed in the lowest position with call light in reach.   Marko Gerardo [place] [facility] [place] NS

## 2022-03-30 NOTE — CARE COORDINATION
3/30/22, 11:03 AM EDT    Patient goals/plan/ treatment preferences discussed by  and . Patient goals/plan/ treatment preferences reviewed with patient/ family. Patient/ family verbalize understanding of discharge plan and are in agreement with goal/plan/treatment preferences. Understanding was demonstrated using the teach back method. AVS provided by RN at time of discharge, which includes all necessary medical information pertaining to the patients current course of illness, treatment, post-discharge goals of care, and treatment preferences. Pt to be discharged to home. He denies needs or services.

## 2022-03-30 NOTE — DISCHARGE SUMMARY
Discharge Summary    Patient:  Jose Angel Hoffman  YOB: 1990    MRN: [id number]   Acct: [de-identified]    Primary Care Physician: Veronica Cade DO    Admit date:  3/28/2022    Discharge date:  3/30/2022       Discharge Diagnoses:   Colitis with rectal bleeding  Principal Problem:    Colitis with rectal bleeding  Resolved Problems:    * No resolved hospital problems. *        Admitted for: (HPI) allergic dermatitis secondary to Augmentin, infectious colitis    Hospital Course: Briefly this is a 26-year-old male that was admitted to the hospital after having been started on Augmentin on outpatient basis after having had an ED visit for abdominal cramping and rectal bleeding, the patient had undergone CT of the abdomen prior to admission confirming what appears to be some infectious colitis, the patient ultimately appeared to have allergic dermatitis secondary to Augmentin which resolved after admission from withholding Augmentin and giving Benadryl, the patient has hemoglobin of 15 g/dL his baseline and it dropped to 13 g/dL therefore he was also hospitalized to monitor his hemoglobin, his hemoglobin remained stable he remained hemodynamically stable he was placed on ciprofloxacin and Flagyl to treat for enteric pathogens however he appears of had a mild allergic reaction to ciprofloxacin therefore he will be discharged on metronidazole patient be discharged good condition, have asked him to follow-up on a long-term basis with PCP for referral to GI ultimately he may require a colonoscopy and/or EGD within a year there is under preventive family history of irritable bowel syndrome or ulcerative colitis, dietary limitations were discussed he will be discharged in good condition his questions were answered at bedside.     Consultants:  Patient Care Team:  Aminata Saldaña DO as PCP - General (Family Medicine)    Discharge Medications:       Medication List      START taking these medications metroNIDAZOLE 500 MG tablet  Commonly known as: FLAGYL  Take 1 tablet by mouth 2 times daily for 7 days  Notes to patient: An antibiotic for current infection     ondansetron 4 MG tablet  Commonly known as: ZOFRAN  Take 1 tablet by mouth 3 times daily as needed for Nausea or Vomiting        CHANGE how you take these medications    HYDROcodone-acetaminophen 5-325 MG per tablet  Commonly known as: Norco  Take 1 tablet by mouth every 8 hours as needed for Pain for up to 3 days. Intended supply: 3 days. Take lowest dose possible to manage pain  What changed: when to take this  Notes to patient: For moderate to severe pain (pain 4-10 out of 10). CONTINUE taking these medications    omeprazole 20 MG delayed release capsule  Commonly known as: PRILOSEC  Take 1 capsule by mouth every morning (before breakfast)     ondansetron 4 MG disintegrating tablet  Commonly known as: ZOFRAN-ODT  Take 1 tablet by mouth 3 times daily as needed for Nausea or Vomiting  Notes to patient: See above        STOP taking these medications    aluminum & magnesium hydroxide-simethicone 200-200-20 MG/5ML Susp suspension  Commonly known as: Mylanta     amoxicillin-clavulanate 875-125 MG per tablet  Commonly known as: Augmentin  Notes to patient: Antibiotic you had allergic reaction to (hives).             Where to Get Your Medications      You can get these medications from any pharmacy    Bring a paper prescription for each of these medications  · HYDROcodone-acetaminophen 5-325 MG per tablet  · metroNIDAZOLE 500 MG tablet  · ondansetron 4 MG tablet           Physical Exam:    Vitals:  Vitals:    03/29/22 2138 03/29/22 2316 03/30/22 0357 03/30/22 0730   BP: 124/71 138/86 101/84 (!) 143/93   Pulse: 87 92 88 92   Resp: 18 18 16 16   Temp: 98.2 °F (36.8 °C) 98.3 °F (36.8 °C) 98 °F (36.7 °C) 98.2 °F (36.8 °C)   TempSrc: Oral Oral Oral Oral   SpO2: 99% 100% 99%    Weight: 170 lb 3.2 oz (77.2 kg)      Height:         Weight: Weight: 170 lb 3.2 oz (77.2 kg)     24 hour intake/output:    Intake/Output Summary (Last 24 hours) at 3/30/2022 1725  Last data filed at 3/30/2022 0801  Gross per 24 hour   Intake 10 ml   Output 0 ml   Net 10 ml       General appearance - alert awake appears to be in no acute distress  Chest - Bilateral air entry, no wheeze  Heart - S1S2 RRR, no murmurs or gallops  Abdomen - soft, non tender, normoactive bowel sounds  Neurological - non focal  Extremities - no edema  Skin - no rashes or lesions    Procedures:  na    Diagnostic Test:  na    Radiology reports as per the Radiologist  Radiology:  CT ABDOMEN PELVIS W IV CONTRAST Additional Contrast? None    Result Date: 3/27/2022  PROCEDURE: CT ABDOMEN PELVIS W IV CONTRAST CLINICAL INFORMATION: 80-year-old male with epigastric/mid abdominal pain . COMPARISON: None. TECHNIQUE: 5 mm axial CT images were obtained through the abdomen and pelvis after the administration of intravenous and oral contrast. Coronal and sagittal reconstructions were obtained. All CT scans at this facility use dose modulation, iterative reconstruction, and/or weight-based dosing when appropriate to reduce radiation dose to as low as reasonably achievable. FINDINGS: The lung bases are clear. There is no pleural effusion. The base of the heart is within acceptable limits. The liver is somewhat hypoattenuated which may be related to fatty infiltration. The gallbladder, spleen, pancreas and adrenal glands are within normal limits. There is a cyst arising from the left kidney. There is no hydronephrosis. There is no evidence of a small bowel obstruction. There is thickening throughout several portions of the colon including the ascending colon, hepatic flexure, descending colon and sigmoid colon. There is circumferential thickening of the urinary bladder wall. The aorta and the IVC are normal in caliber. There is no ascites. No free air. The bones are intact.     1. Scattered areas of thickening with inflammatory changes in the adjacent fat involving several segments of the colon concerning for colitis. 2. Circumferential wall thickening of the urinary bladder which may be related to cystitis. 3. Hypoattenuation of the liver which may be related to steatosis. **This report has been created using voice recognition software. It may contain minor errors which are inherent in voice recognition technology. ** Final report electronically signed by Dr Darrius Albert on 3/27/2022 3:12 PM      Results for orders placed or performed during the hospital encounter of 03/28/22   CBC with Auto Differential   Result Value Ref Range    WBC 9.4 4.8 - 10.8 thou/mm3    RBC 4.32 (L) 4.70 - 6.10 mill/mm3    Hemoglobin 13.4 (L) 14.0 - 18.0 gm/dl    Hematocrit 39.8 (L) 42.0 - 52.0 %    MCV 92.1 80.0 - 94.0 fL    MCH 31.0 26.0 - 33.0 pg    MCHC 33.7 32.2 - 35.5 gm/dl    RDW-CV 12.3 11.5 - 14.5 %    RDW-SD 41.7 35.0 - 45.0 fL    Platelets 205 103 - 832 thou/mm3    MPV 9.9 9.4 - 12.4 fL    Seg Neutrophils 79.3 %    Lymphocytes 16.2 %    Monocytes 4.1 %    Eosinophils 0.1 %    Basophils 0.1 %    Immature Granulocytes 0.2 %    Segs Absolute 7.5 1.8 - 7.7 thou/mm3    Lymphocytes Absolute 1.5 1.0 - 4.8 thou/mm3    Monocytes Absolute 0.4 0.4 - 1.3 thou/mm3    Eosinophils Absolute 0.0 0.0 - 0.4 thou/mm3    Basophils Absolute 0.0 0.0 - 0.1 thou/mm3    Immature Grans (Abs) 0.02 0.00 - 0.07 thou/mm3    nRBC 0 /100 wbc   Basic Metabolic Panel   Result Value Ref Range    Sodium 132 (L) 135 - 145 meq/L    Potassium 3.6 3.5 - 5.2 meq/L    Chloride 96 (L) 98 - 111 meq/L    CO2 23 23 - 33 meq/L    Glucose 148 (H) 70 - 108 mg/dL    BUN 11 7 - 22 mg/dL    CREATININE 1.0 0.4 - 1.2 mg/dL    Calcium 8.8 8.5 - 10.5 mg/dL   Lactic Acid   Result Value Ref Range    Lactic Acid 1.4 0.5 - 2.0 mmol/L   Anion Gap   Result Value Ref Range    Anion Gap 13.0 8.0 - 16.0 meq/L   Glomerular Filtration Rate, Estimated   Result Value Ref Range    Est, Glom Filt Rate 87 (A) ml/min/1.73m2 Osmolality   Result Value Ref Range    Osmolality Calc 266.7 (L) 275.0 - 300.0 mOsmol/kg   Hemoglobin   Result Value Ref Range    Hemoglobin 13.3 (L) 14.0 - 18.0 gm/dl   Hemoglobin   Result Value Ref Range    Hemoglobin 13.0 (L) 14.0 - 18.0 gm/dl   Magnesium   Result Value Ref Range    Magnesium 2.2 1.6 - 2.4 mg/dL   Phosphorus   Result Value Ref Range    Phosphorus 1.5 (L) 2.4 - 4.7 mg/dL   Basic Metabolic Panel   Result Value Ref Range    Sodium 136 135 - 145 meq/L    Potassium 4.6 3.5 - 5.2 meq/L    Chloride 104 98 - 111 meq/L    CO2 22 (L) 23 - 33 meq/L    Glucose 166 (H) 70 - 108 mg/dL    BUN 8 7 - 22 mg/dL    CREATININE 0.8 0.4 - 1.2 mg/dL    Calcium 9.0 8.5 - 10.5 mg/dL   Anion Gap   Result Value Ref Range    Anion Gap 10.0 8.0 - 16.0 meq/L   Glomerular Filtration Rate, Estimated   Result Value Ref Range    Est, Glom Filt Rate >90 ml/min/1.73m2       Diet:  No diet orders on file    Activity:  Activity as tolerated (Patient may move about with assist as indicated or with supervision.)    Follow-up:  in the next few weeks with Griffin Shelby DO,  in the next few weeks     Disposition: home    Condition: Stable      Time Spent: 35 minutes    Electronically signed by Yady Soto MD on 3/30/2022 at 5:25 PM    Discharging Hospitalist

## 2022-03-31 ENCOUNTER — TELEPHONE (OUTPATIENT)
Dept: FAMILY MEDICINE CLINIC | Age: 32
End: 2022-03-31

## 2022-03-31 NOTE — LETTER
[address] [place] [address],[address] [facility] [address]  [facility] [address]  Phone: [phone]  Fax: [address] [facility], DO       April 11, 2022    Abdiel Leavitt 89      Dear Fidencio Keyes:    We have made several attempts to contact you by phone and have   been unsuccessful. Please call our office at your earliest convenience  At [phone] opt [phone]. Thank you.       Sincerely,        Soledad Donnelly DO

## 2022-03-31 NOTE — TELEPHONE ENCOUNTER
[facility]by [facility] Transitions Initial Follow Up Call    Outreach made within 2 business days of discharge: Yes    Patient: Anne Parent Patient : 1990   MRN: [id number]  Reason for Admission: There are no discharge diagnoses documented for the most recent discharge. Discharge Date: 3/30/22       Spoke with: JOSÉ MANUEL for patient to return call at their earliest convenience. Discharge department/facility: [address] [place] [address]      Scheduled appointment with PCP within 7-14 days    Follow Up  No future appointments.     Мария Ruiz CMA (AAMA)

## 2022-04-05 ASSESSMENT — ENCOUNTER SYMPTOMS
ABDOMINAL PAIN: 1
RHINORRHEA: 0
NAUSEA: 1
BLOOD IN STOOL: 1
BACK PAIN: 0
CHEST TIGHTNESS: 0
DIARRHEA: 1
COUGH: 0
EYE REDNESS: 0
VOMITING: 1

## 2022-04-05 NOTE — ED PROVIDER NOTES
[facility] Emergency Department    CHIEF COMPLAINT       Chief Complaint   Patient presents with    Allergic Reaction       Nurses Notes reviewed and I agree except as noted in the HPI. HISTORY OF PRESENT ILLNESS    Yaya Franz cuong 32 y.o. male who presents to the ED for evaluation of hives and abdominal pain. The patient was seen yesterday for what appears to be gastroenteritis. He was given medications and patient developed hives today. Patient has continued pain in the abdomen. CT scan at the time of that visit showed colitis. Patient is still unable to tolerate much oral intake and is having sunni diarrhea. He is in obvious discomfort. HPI was provided by the patient    REVIEW OF SYSTEMS     Review of Systems   Constitutional: Negative for chills, fatigue and fever. HENT: Negative for congestion, ear discharge, ear pain, postnasal drip and rhinorrhea. Eyes: Negative for redness. Respiratory: Negative for cough and chest tightness. Cardiovascular: Negative for chest pain and leg swelling. Gastrointestinal: Positive for abdominal pain, blood in stool, diarrhea, nausea and vomiting. Genitourinary: Negative for difficulty urinating, dysuria, enuresis, flank pain and hematuria. Musculoskeletal: Negative for back pain and joint swelling. Skin: Positive for rash. Neurological: Negative for dizziness, light-headedness, numbness and headaches. Psychiatric/Behavioral: Negative for agitation, behavioral problems and confusion. All other systems negative except as noted. PAST MEDICAL HISTORY   History reviewed. No pertinent past medical history. SURGICALHISTORY      has no past surgical history on file.     CURRENT MEDICATIONS       Discharge Medication List as of 3/30/2022 12:40 PM      CONTINUE these medications which have NOT CHANGED    Details   omeprazole (PRILOSEC) 20 MG delayed release capsule Take 1 capsule by mouth every morning (before breakfast), Disp-30 capsule, R-0Normal      ondansetron (ZOFRAN-ODT) 4 MG disintegrating tablet Take 1 tablet by mouth 3 times daily as needed for Nausea or Vomiting, Disp-21 tablet, R-0Normal             ALLERGIES     is allergic to blueberry flavor, augmentin [amoxicillin-pot clavulanate], ciprofloxacin, and mylanta [alum & mag hydroxide-simeth]. FAMILY HISTORY     He indicated that his mother is alive. He indicated that his father is . family history is not on file. SOCIAL HISTORY       Social History     Socioeconomic History    Marital status: Single     Spouse name: Not on file    Number of children: Not on file    Years of education: Not on file    Highest education level: Not on file   Occupational History    Not on file   Tobacco Use    Smoking status: Current Every Day Smoker     Packs/day: 0.25     Types: Cigarettes    Smokeless tobacco: Never Used   Substance and Sexual Activity    Alcohol use: Yes     Alcohol/week: 1.0 standard drink     Types: 1 Cans of beer per week     Comment: couple times per week    Drug use: Yes     Types: Marijuana Darliss Meeter)     Comment: occasional    Sexual activity: Not on file   Other Topics Concern    Not on file   Social History Narrative    Not on file     Social Determinants of Health     Financial Resource Strain: Low Risk     Difficulty of Paying Living Expenses: Not hard at all   Food Insecurity: No Food Insecurity    Worried About Running Out of Food in the Last Year: Never true    Juli of Food in the Last Year: Never true   Transportation Needs:     Lack of Transportation (Medical): Not on file    Lack of Transportation (Non-Medical):  Not on file   Physical Activity:     Days of Exercise per Week: Not on file    Minutes of Exercise per Session: Not on file   Stress:     Feeling of Stress : Not on file   Social Connections:     Frequency of Communication with Friends and Family: Not on file    Frequency of Social Gatherings with Friends and Family: Not on file    Attends Caodaism Services: Not on file    Active Member of Clubs or Organizations: Not on file    Attends Club or Organization Meetings: Not on file    Marital Status: Not on file   Intimate Partner Violence:     Fear of Current or Ex-Partner: Not on file    Emotionally Abused: Not on file    Physically Abused: Not on file    Sexually Abused: Not on file   Housing Stability:     Unable to Pay for Housing in the Last Year: Not on file    Number of [place] in the Last Year: Not on file    Unstable Housing in the Last Year: Not on file       PHYSICAL EXAM     INITIAL VITALS:  height is 5' 8\" (1.727 m) and weight is 170 lb 3.2 oz (77.2 kg). His oral temperature is 98.2 °F (36.8 °C). His blood pressure is 143/93 (abnormal) and his pulse is 92. His respiration is 16 and oxygen saturation is 99%. Physical Exam  Vitals and nursing note reviewed. Constitutional:       General: He is not in acute distress. Appearance: Normal appearance. He is well-developed. He is ill-appearing. He is not diaphoretic. HENT:      Head: Normocephalic and atraumatic. Nose: Nose normal.      Mouth/Throat:      Mouth: Mucous membranes are moist.      Pharynx: Oropharynx is clear. Eyes:      General:         Right eye: No discharge. Left eye: No discharge. Conjunctiva/sclera: Conjunctivae normal.   Neck:      Trachea: No tracheal deviation. Cardiovascular:      Rate and Rhythm: Normal rate and regular rhythm. Pulses: Normal pulses. Heart sounds: Normal heart sounds. No murmur heard. No gallop. Comments: Normal capillary refill  Pulmonary:      Effort: Pulmonary effort is normal. No respiratory distress. Breath sounds: Normal breath sounds. No stridor. Abdominal:      General: Bowel sounds are normal. There is no distension. Palpations: Abdomen is soft. Tenderness: There is abdominal tenderness (Worse in the left lower quadrant but diffuse). Musculoskeletal:         General: No tenderness or deformity. Normal range of motion. Cervical back: Normal range of motion. Skin:     General: Skin is warm and dry. Capillary Refill: Capillary refill takes less than 2 seconds. Coloration: Skin is not pale. Findings: Rash (Hives all over the body) present. No erythema. Neurological:      General: No focal deficit present. Mental Status: He is alert and oriented to person, place, and time. Cranial Nerves: No cranial nerve deficit. Psychiatric:         Behavior: Behavior normal.         DIFFERENTIAL DIAGNOSIS:   Gastroenteritis, colitis, allergic reaction    DIAGNOSTIC RESULTS     EKG: All EKG's are interpreted by the Emergency Department Physician who eithersigns or Co-signs this chart in the absence of a cardiologist.        RADIOLOGY: non-plainfilm images(s) such as CT, Ultrasound and MRI are read by the radiologist.  Plain radiographic images are visualized and preliminarily interpreted by the emergency physician unless otherwise stated below.   No orders to display         LABS:   Labs Reviewed   CBC WITH AUTO DIFFERENTIAL - Abnormal; Notable for the following components:       Result Value    RBC 4.32 (*)     Hemoglobin 13.4 (*)     Hematocrit 39.8 (*)     All other components within normal limits   BASIC METABOLIC PANEL - Abnormal; Notable for the following components:    Sodium 132 (*)     Chloride 96 (*)     Glucose 148 (*)     All other components within normal limits   GLOMERULAR FILTRATION RATE, ESTIMATED - Abnormal; Notable for the following components:    Est, Glom Filt Rate 87 (*)     All other components within normal limits   OSMOLALITY - Abnormal; Notable for the following components:    Osmolality Calc 266.7 (*)     All other components within normal limits   HEMOGLOBIN - Abnormal; Notable for the following components:    Hemoglobin 13.3 (*)     All other components within normal limits   HEMOGLOBIN - Abnormal; Notable for the following components:    Hemoglobin 13.0 (*)     All other components within normal limits   PHOSPHORUS - Abnormal; Notable for the following components:    Phosphorus 1.5 (*)     All other components within normal limits   BASIC METABOLIC PANEL - Abnormal; Notable for the following components:    CO2 22 (*)     Glucose 166 (*)     All other components within normal limits   GASTROINTESTINAL PANEL, MOLECULAR   LACTIC ACID   ANION GAP   MAGNESIUM   ANION GAP   GLOMERULAR FILTRATION RATE, ESTIMATED       EMERGENCY DEPARTMENT COURSE:   Vitals:    Vitals:    03/29/22 2138 03/29/22 2316 03/30/22 0357 03/30/22 0730   BP: 124/71 138/86 101/84 (!) 143/93   Pulse: 87 92 88 92   Resp: 18 18 16 16   Temp: 98.2 °F (36.8 °C) 98.3 °F (36.8 °C) 98 °F (36.7 °C) 98.2 °F (36.8 °C)   TempSrc: Oral Oral Oral Oral   SpO2: 99% 100% 99%    Weight: 170 lb 3.2 oz (77.2 kg)      Height:                           Internal Administration   First Dose      Second Dose           Last COVID Lab No results found for: SARS-COV-2, SARS-COV-2 RNA, SARS-COV-2, SARS-COV-2, SARS-COV-2 BY PCR, SARS-COV-2, SARS-COV-2, SARS-COV-2         MDM    Patient was seen and evaluated in the emergency department for worsening abdominal pain, hives, nausea, vomiting, diarrhea. Appropriate labs and imaging are ordered and reviewed. Patient treated with IV fluids, Benadryl, Solu-Medrol and Zofran for the symptoms. Patient is also given some morphine for pain. Patient was started on Cipro and Flagyl for the colitis. Discussed the case with the hospitalist and they agreed to admit the patient due to failed outpatient treatment. I discussed this with the patient and he is agreeable to admission.     Medications   0.9 % sodium chloride infusion (0 mL/hr IntraVENous Stopped 3/29/22 1948)   diphenhydrAMINE (BENADRYL) injection 50 mg (50 mg IntraVENous Given 3/29/22 0020)   methylPREDNISolone sodium (SOLU-MEDROL) injection 125 mg (125 mg IntraVENous Given 3/29/22 0019)   ondansetron (ZOFRAN) injection 4 mg (4 mg IntraVENous Given 3/29/22 0018)   morphine (PF) injection 4 mg (4 mg IntraVENous Given 3/29/22 0022)   0.9 % sodium chloride bolus (0 mLs IntraVENous Stopped 3/29/22 0135)   ciprofloxacin (CIPRO) IVPB 400 mg (0 mg IntraVENous Stopped 3/29/22 0357)   metronidazole (FLAGYL) 500 mg in NaCl 100 mL IVPB premix (0 mg IntraVENous Stopped 3/29/22 0252)   potassium chloride (KLOR-CON M) extended release tablet 40 mEq (40 mEq Oral Given 3/29/22 0400)   sodium phosphate 20 mmol in dextrose 5 % 250 mL IVPB (0 mmol IntraVENous Stopped 3/29/22 1645)       Please note that the patient was evaluated during a pandemic. All efforts were made for HIPPA compliance as well as provision of appropriate care. Patient was seen independently by myself. The patient's final impression and disposition and plan was determined by myself. Strict return precautions and follow up instructions were discussed with the patient prior to discharge, with which the patient agrees. Physical assessment findings, diagnostic testing(s) if applicable, and vital signs reviewed with patient/patient representative. Questions answered. Medications asdirected, including OTC medications for supportive care. Education provided on medications. Differential diagnosis(s) discussed with patient/patient representative. Home care/self care instructions reviewed withpatient/patient representative. Patient is to follow-up with family care provider in 2-3 days if no improvement. Patient is to go to the emergency department if symptoms worsen. Patient/patient representative isaware of care plan, questions answered, verbalizes understanding and is in agreement. CRITICAL CARE:   None    CONSULTS:  Hospitalist    PROCEDURES:  None    FINAL IMPRESSION     1. Gastroenteritis/colitis, infectious    2. Acute blood loss anemia    3.  Colitis with rectal bleeding          DISPOSITION/PLAN   DISPOSITION

## 2022-04-11 NOTE — TELEPHONE ENCOUNTER
Abby [facility] Transitions Initial Follow Up Call    Outreach made within 2 business days of discharge: Yes    Patient: Tate Nguyen Patient : 1990   MRN: [id number]  Reason for Admission: There are no discharge diagnoses documented for the most recent discharge. Discharge Date: 3/30/22       Spoke with: JOSÉ MANUEL for patient to return call at their earliest convenience. Discharge department/facility: [facility]    Contact letter sent. Scheduled appointment with PCP within 7-14 days    Follow Up  No future appointments.     Мария Ruiz CMA (AAMA)

## 2023-03-06 ENCOUNTER — HOSPITAL ENCOUNTER (EMERGENCY)
Age: 33
Discharge: HOME | End: 2023-03-06
Payer: COMMERCIAL

## 2023-03-06 VITALS
BODY MASS INDEX: 26.52 KG/M2 | HEIGHT: 68 IN | RESPIRATION RATE: 18 BRPM | DIASTOLIC BLOOD PRESSURE: 91 MMHG | SYSTOLIC BLOOD PRESSURE: 136 MMHG | WEIGHT: 175 LBS | HEART RATE: 79 BPM | OXYGEN SATURATION: 100 % | TEMPERATURE: 97.3 F

## 2023-03-06 DIAGNOSIS — S29.012A STRAIN OF THORACIC BACK REGION: ICD-10-CM

## 2023-03-06 DIAGNOSIS — M77.8 TENDINITIS OF RIGHT ELBOW: Primary | ICD-10-CM

## 2023-03-06 PROCEDURE — 99213 OFFICE O/P EST LOW 20 MIN: CPT | Performed by: NURSE PRACTITIONER

## 2023-03-06 PROCEDURE — 99213 OFFICE O/P EST LOW 20 MIN: CPT

## 2023-03-06 RX ORDER — PREDNISONE 20 MG/1
40 TABLET ORAL DAILY
Qty: 14 TABLET | Refills: 0 | Status: SHIPPED | OUTPATIENT
Start: 2023-03-06 | End: 2023-03-13

## 2023-03-06 RX ORDER — CYCLOBENZAPRINE HCL 10 MG
10 TABLET ORAL 3 TIMES DAILY PRN
Qty: 15 TABLET | Refills: 0 | Status: SHIPPED | OUTPATIENT
Start: 2023-03-06

## 2023-03-06 ASSESSMENT — ENCOUNTER SYMPTOMS
NAUSEA: 0
SHORTNESS OF BREATH: 0
SORE THROAT: 0
VOMITING: 0

## 2023-03-06 ASSESSMENT — PAIN - FUNCTIONAL ASSESSMENT: PAIN_FUNCTIONAL_ASSESSMENT: WONG-BAKER FACES

## 2023-03-06 ASSESSMENT — PAIN DESCRIPTION - ORIENTATION: ORIENTATION: LOWER;RIGHT

## 2023-03-06 ASSESSMENT — PAIN DESCRIPTION - DESCRIPTORS: DESCRIPTORS: DISCOMFORT

## 2023-03-06 ASSESSMENT — PAIN DESCRIPTION - LOCATION: LOCATION: BACK;ARM

## 2023-03-06 ASSESSMENT — PAIN SCALES - WONG BAKER: WONGBAKER_NUMERICALRESPONSE: 2

## 2023-03-06 NOTE — ED PROVIDER NOTES
[facility] Encounter       CHIEF COMPLAINT       Chief Complaint   Patient presents with    Back Pain     lower    Shoulder Pain     right    Elbow Pain     right       Nurses Notes reviewed and I agree except as noted in the HPI. HISTORY OF PRESENT ILLNESS   Yaya Whitfield is a 28 y.o. male who presents for evaluation of mid back \"irritation\" as well as \"irritation\" to the right elbow that radiates into the shoulder and down to the wrist.  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 He denies any injury or trauma to the back or to the arm. He states that he did take some ibuprofen at home which does help with the symptoms for a very short time but then they quickly returned. He denies any numbness or tingling or any cough, congestion, runny nose or any other upper respiratory complaints. The history is provided by the patient. REVIEW OF SYSTEMS     Review of Systems   Constitutional:  Negative for chills and fever. HENT:  Negative for congestion and sore throat. Respiratory:  Negative for shortness of breath. Cardiovascular:  Negative for chest pain. Gastrointestinal:  Negative for nausea and vomiting. Musculoskeletal:  Positive for arthralgias and myalgias. Skin:  Negative for rash. Neurological:  Negative for weakness and numbness. PAST MEDICAL HISTORY   History reviewed. No pertinent past medical history. SURGICALHISTORY     Patient  has no past surgical history on file.     CURRENT MEDICATIONS       Previous Medications    OMEPRAZOLE (PRILOSEC) 20 MG DELAYED RELEASE CAPSULE    Take 1 capsule by mouth every morning (before breakfast)    ONDANSETRON (ZOFRAN) 4 MG TABLET    Take 1 tablet by mouth 3 times daily as needed for Nausea or Vomiting    ONDANSETRON (ZOFRAN-ODT) 4 MG DISINTEGRATING TABLET    Take 1 tablet by mouth 3 times daily as needed for Nausea or Vomiting       ALLERGIES     Patient is is allergic to blueberry flavor, augmentin [amoxicillin-pot clavulanate], ciprofloxacin, and mylanta [alum & mag hydroxide-simeth]. Patients   There is no immunization history on file for this patient. FAMILY HISTORY     Patient's family history is not on file. SOCIAL HISTORY     Patient  reports that he has been smoking cigarettes. He has been smoking an average of .25 packs per day. He has never used smokeless tobacco. He reports current alcohol use of about 1.0 standard drink per week. He reports current drug use. Drug: Marijuana Joseline Estrada). PHYSICAL EXAM     ED TRIAGE VITALS  BP: (!) 136/91, Temp: 97.3 °F (36.3 °C), Heart Rate: 79, Resp: 18, SpO2: 100 %,Estimated body mass index is 26.61 kg/m² as calculated from the following:    Height as of this encounter: 5' 8\" (1.727 m). Weight as of this encounter: 175 lb (79.4 kg). ,No LMP for male patient. Physical Exam  Vitals and nursing note reviewed. Constitutional:       General: He is not in acute distress. Appearance: He is well-developed. He is not diaphoretic. Eyes:      Conjunctiva/sclera:      Right eye: Right conjunctiva is not injected. Left eye: Left conjunctiva is not injected. Pupils: Pupils are equal.   Cardiovascular:      Rate and Rhythm: Normal rate and regular rhythm. Heart sounds: No murmur heard. Pulmonary:      Effort: Pulmonary effort is normal. No respiratory distress. Breath sounds: Normal breath sounds. Musculoskeletal:      Right shoulder: Normal. No swelling, tenderness, bony tenderness or crepitus. Normal range of motion. Normal strength. Right elbow: No swelling. Normal range of motion. Tenderness present. Right hand: Normal. Normal range of motion. Normal sensation. Normal capillary refill. Normal pulse. Arms:       Cervical back: Normal range of motion. Back:    Skin:     General: Skin is warm. Findings: No rash.    Neurological:      Mental Status: He is alert and oriented to person, place, and time. Psychiatric:         Behavior: Behavior normal.       DIAGNOSTIC RESULTS     Labs:No results found for this visit on 03/06/23. IMAGING:    No orders to display         EKG:      URGENT CARE COURSE:     Vitals:    03/06/23 1705   BP: (!) 136/91   Pulse: 79   Resp: 18   Temp: 97.3 °F (36.3 °C)   TempSrc: Temporal   SpO2: 100%   Weight: 175 lb (79.4 kg)   Height: 5' 8\" (1.727 m)       Medications - No data to display         PROCEDURES:  None    FINAL IMPRESSION      1. Tendinitis of right elbow    2. Strain of thoracic back region          DISPOSITION/ PLAN     I discussed with the patient that exam is consistent with tendinitis of the right elbow as well as a strain of the mid back. Discussed that this is likely related to the job that he does as well as the position that he is sitting and while doing nails. Discussed the plan to treat with prednisone and muscle relaxers and he is advised to continue ibuprofen at home. He is instructed to follow-up on an outpatient basis if symptoms do not improve or would worsen and he is agreeable to plan as discussed. PATIENT REFERRED TO:  Janelle Verma, DO  [address] / [facility] [address]      DISCHARGE MEDICATIONS:  New Prescriptions    CYCLOBENZAPRINE (FLEXERIL) 10 MG TABLET    Take 1 tablet by mouth 3 times daily as needed for Muscle spasms . Do not drive or operate heavy machinery while taking this medication.     PREDNISONE (DELTASONE) 20 MG TABLET    Take 2 tablets by mouth daily for 7 days       Discontinued Medications    No medications on file       Current Discharge Medication List          MARTIN Vergara CNP    (Please note that portions of this note were completed with a voice recognition program. Efforts were made to edit the dictations but occasionally words are mis-transcribed.)          MARTIN Vergara CNP  03/06/23 2748

## 2023-03-06 NOTE — ED NOTES
Pt with complaints of right shoulder pain, right elbow pain and lower back pain that started about a week ago. Pt denies any injury. States the pain is just irritating. States last night he could not sleep due to pain. States he has been taking Ibuprofen and it helps but does not take pain away completely.       Magdalen Essex, LPN  03/64/91 4989

## 2023-03-07 ENCOUNTER — TELEPHONE (OUTPATIENT)
Dept: FAMILY MEDICINE CLINIC | Age: 33
End: 2023-03-07

## 2023-03-07 NOTE — LETTER
[address] [facility]  [address], [address]  Phone: [phone]  Fax: [phone]    March 7, 2023    [address] [place] [address] Erianmol Sanders    Dear Maggie Mata,    This letter is regarding your Emergency Department (ED) visit at [address] [address] on 3/6/23. Awais Verma wanted to make sure that you understand your discharge instructions and that you were able to fill any prescriptions that may have been ordered for you. Please contact the office at the above phone number if the ED advised you to follow up with Awais Verma, or if you have any further questions or needs. Also did you know -   *Visiting the ED for a non-emergency could result in higher co-pays than you would normally be subject to paying? *You can call your doctor even after hours so they can direct you to the most appropriate care. [facility] practices can often offer you an appointment on the same day that you call. *We have some [facility] offices that offer Walk-in appointments; check our website for availability in your community, [email or web address].      *Evisits are now available for patients for $36 through [employer] for certain conditions:  * Sinus, cold and or cough       * Diarrhea            * Headache  * Heartburn                                * Poison Kierra          * Back pain     * Urinary problems                         If you do not have [employer] and are interested, please contact the office and a staff member may assist you or go to [email or web address].     Sincerely,   Brandon Gauthier DO and your [facility]

## 2025-05-09 ENCOUNTER — HOSPITAL ENCOUNTER (EMERGENCY)
Age: 35
Discharge: HOME | End: 2025-05-09
Attending: EMERGENCY MEDICINE
Payer: COMMERCIAL

## 2025-05-09 ENCOUNTER — APPOINTMENT (OUTPATIENT)
Dept: CT IMAGING | Age: 35
End: 2025-05-09
Payer: COMMERCIAL

## 2025-05-09 VITALS
BODY MASS INDEX: 26.66 KG/M2 | SYSTOLIC BLOOD PRESSURE: 133 MMHG | OXYGEN SATURATION: 100 % | RESPIRATION RATE: 18 BRPM | TEMPERATURE: 98.3 F | WEIGHT: 180 LBS | DIASTOLIC BLOOD PRESSURE: 90 MMHG | HEART RATE: 90 BPM | HEIGHT: 69 IN

## 2025-05-09 DIAGNOSIS — R31.9 HEMATURIA, UNSPECIFIED TYPE: ICD-10-CM

## 2025-05-09 DIAGNOSIS — N20.1 URETEROLITHIASIS: Primary | ICD-10-CM

## 2025-05-09 LAB
ANION GAP SERPL CALC-SCNC: 14 MEQ/L (ref 8–16)
BACTERIA URNS QL MICRO: ABNORMAL /HPF
BASOPHILS ABSOLUTE: 0 THOU/MM3 (ref 0–0.1)
BASOPHILS NFR BLD AUTO: 0.5 %
BILIRUB UR QL STRIP.AUTO: ABNORMAL
BUN SERPL-MCNC: 20 MG/DL (ref 8–23)
C CAYETANENSIS DNA SPEC QL NAA+PROBE: NOT DETECTED
CALCIUM SERPL-MCNC: 9.5 MG/DL (ref 8.6–10)
CAMPY SP DNA.DIARRHEA STL QL NAA+PROBE: NOT DETECTED
CASTS #/AREA URNS LPF: ABNORMAL /LPF
CASTS 2: ABNORMAL /LPF
CHARACTER UR: ABNORMAL
CHLORIDE SERPL-SCNC: 101 MEQ/L (ref 98–111)
CO2 SERPL-SCNC: 23 MEQ/L (ref 22–29)
COLOR, UA: ABNORMAL
CREAT SERPL-MCNC: 1.3 MG/DL (ref 0.7–1.2)
CRYPTOSP DNA SPEC QL NAA+PROBE: NOT DETECTED
CRYSTALS URNS MICRO: ABNORMAL
DEPRECATED RDW RBC AUTO: 42.3 FL (ref 35–45)
E COLI O157H7 DNA SPEC QL NAA+PROBE: NORMAL
E HISTOLYT DNA SPEC QL NAA+PROBE: NOT DETECTED
EAEC PAA PLAS AGGR+AATA ST NAA+NON-PRB: NOT DETECTED
EC STX1+STX2 + H7 FLIC SPEC NAA+PROBE: NOT DETECTED
EOSINOPHIL NFR BLD AUTO: 3.5 %
EOSINOPHILS ABSOLUTE: 0.3 THOU/MM3 (ref 0–0.4)
EPEC EAE GENE STL QL NAA+NON-PROBE: NOT DETECTED
EPITHELIAL CELLS, UA: ABNORMAL /HPF
ERYTHROCYTE [DISTWIDTH] IN BLOOD BY AUTOMATED COUNT: 12.8 % (ref 11.5–14.5)
ETEC LTA+ST1A+ST1B TOX ST NAA+NON-PROBE: NOT DETECTED
G LAMBLIA DNA SPEC QL NAA+PROBE: NOT DETECTED
GFR SERPL CREATININE-BSD FRML MDRD: 74 ML/MIN/1.73M2
GLUCOSE SERPL-MCNC: 118 MG/DL (ref 74–109)
GLUCOSE UR QL STRIP.AUTO: NEGATIVE MG/DL
HADV DNA SPEC QL NAA+PROBE: NOT DETECTED
HASTV RNA SPEC QL NAA+PROBE: NOT DETECTED
HCT VFR BLD AUTO: 43.9 % (ref 42–52)
HGB BLD-MCNC: 14.7 GM/DL (ref 14–18)
HGB UR QL STRIP.AUTO: ABNORMAL
IMM GRANULOCYTES # BLD AUTO: 0.03 THOU/MM3 (ref 0–0.07)
IMM GRANULOCYTES NFR BLD AUTO: 0.3 %
KETONES UR QL STRIP.AUTO: ABNORMAL
LYMPHOCYTES ABSOLUTE: 2.2 THOU/MM3 (ref 1–4.8)
LYMPHOCYTES NFR BLD AUTO: 22.7 %
MCH RBC QN AUTO: 30.3 PG (ref 26–33)
MCHC RBC AUTO-ENTMCNC: 33.5 GM/DL (ref 32.2–35.5)
MCV RBC AUTO: 90.5 FL (ref 80–94)
MISCELLANEOUS 2: ABNORMAL
MONOCYTES ABSOLUTE: 0.5 THOU/MM3 (ref 0.4–1.3)
MONOCYTES NFR BLD AUTO: 5.4 %
NEUTROPHILS ABSOLUTE: 6.6 THOU/MM3 (ref 1.8–7.7)
NEUTROPHILS NFR BLD AUTO: 67.6 %
NITRITE UR QL STRIP: NEGATIVE
NOROVIRUS GI + GII RNA STL NAA+PROBE: NOT DETECTED
NRBC BLD AUTO-RTO: 0 /100 WBC
OSMOLALITY SERPL CALC.SUM OF ELEC: 279.4 MOSMOL/KG (ref 275–300)
P SHIGELLOIDES DNA STL QL NAA+PROBE: NOT DETECTED
PH UR STRIP.AUTO: 5.5 [PH] (ref 5–9)
PLATELET # BLD AUTO: 337 THOU/MM3 (ref 130–400)
PMV BLD AUTO: 10.3 FL (ref 9.4–12.4)
POTASSIUM SERPL-SCNC: 3.9 MEQ/L (ref 3.5–5.2)
PROT UR STRIP.AUTO-MCNC: 300 MG/DL
RBC # BLD AUTO: 4.85 MILL/MM3 (ref 4.7–6.1)
RBC URINE: > 200 /HPF
RENAL EPI CELLS #/AREA URNS HPF: ABNORMAL /[HPF]
RV RNA SPEC QL NAA+PROBE: NOT DETECTED
SALMONELLA DNA SPEC QL NAA+PROBE: NOT DETECTED
SAPOVIRUS RNA SPEC QL NAA+PROBE: NOT DETECTED
SHIGELLA SP+EIEC IPAH ST NAA+NON-PROBE: NOT DETECTED
SODIUM SERPL-SCNC: 138 MEQ/L (ref 135–145)
SP GR UR REFRACT.AUTO: > 1.03 (ref 1–1.03)
UROBILINOGEN, URINE: 0.2 EU/DL (ref 0–1)
V CHOLERAE DNA SPEC QL NAA+PROBE: NOT DETECTED
VIBRIO DNA SPEC NAA+PROBE: NOT DETECTED
WBC # BLD AUTO: 9.8 THOU/MM3 (ref 4.8–10.8)
WBC #/AREA URNS HPF: ABNORMAL /HPF
WBC #/AREA URNS HPF: ABNORMAL /[HPF]
Y ENTERO RECN STL QL NAA+PROBE: NOT DETECTED
YEAST LIKE FUNGI URNS QL MICRO: ABNORMAL

## 2025-05-09 PROCEDURE — 6360000004 HC RX CONTRAST MEDICATION: Performed by: EMERGENCY MEDICINE

## 2025-05-09 PROCEDURE — 87507 IADNA-DNA/RNA PROBE TQ 12-25: CPT

## 2025-05-09 PROCEDURE — 87086 URINE CULTURE/COLONY COUNT: CPT

## 2025-05-09 PROCEDURE — 80048 BASIC METABOLIC PNL TOTAL CA: CPT

## 2025-05-09 PROCEDURE — 81001 URINALYSIS AUTO W/SCOPE: CPT

## 2025-05-09 PROCEDURE — 85025 COMPLETE CBC W/AUTO DIFF WBC: CPT

## 2025-05-09 PROCEDURE — 6370000000 HC RX 637 (ALT 250 FOR IP): Performed by: EMERGENCY MEDICINE

## 2025-05-09 PROCEDURE — 74177 CT ABD & PELVIS W/CONTRAST: CPT

## 2025-05-09 PROCEDURE — 36415 COLL VENOUS BLD VENIPUNCTURE: CPT

## 2025-05-09 PROCEDURE — 6360000002 HC RX W HCPCS: Performed by: EMERGENCY MEDICINE

## 2025-05-09 PROCEDURE — 99285 EMERGENCY DEPT VISIT HI MDM: CPT

## 2025-05-09 PROCEDURE — 96374 THER/PROPH/DIAG INJ IV PUSH: CPT

## 2025-05-09 RX ORDER — SULFAMETHOXAZOLE AND TRIMETHOPRIM 800; 160 MG/1; MG/1
1 TABLET ORAL 2 TIMES DAILY
Qty: 14 TABLET | Refills: 0 | Status: SHIPPED | OUTPATIENT
Start: 2025-05-09 | End: 2025-05-16

## 2025-05-09 RX ORDER — TAMSULOSIN HYDROCHLORIDE 0.4 MG/1
0.4 CAPSULE ORAL DAILY
Qty: 30 CAPSULE | Refills: 0 | Status: SHIPPED | OUTPATIENT
Start: 2025-05-09

## 2025-05-09 RX ORDER — KETOROLAC TROMETHAMINE 10 MG/1
10 TABLET, FILM COATED ORAL EVERY 6 HOURS PRN
Qty: 20 TABLET | Refills: 0 | Status: SHIPPED | OUTPATIENT
Start: 2025-05-09

## 2025-05-09 RX ORDER — KETOROLAC TROMETHAMINE 30 MG/ML
15 INJECTION, SOLUTION INTRAMUSCULAR; INTRAVENOUS ONCE
Status: COMPLETED | OUTPATIENT
Start: 2025-05-09 | End: 2025-05-09

## 2025-05-09 RX ORDER — TAMSULOSIN HYDROCHLORIDE 0.4 MG/1
0.4 CAPSULE ORAL ONCE
Status: COMPLETED | OUTPATIENT
Start: 2025-05-09 | End: 2025-05-09

## 2025-05-09 RX ORDER — IOPAMIDOL 755 MG/ML
80 INJECTION, SOLUTION INTRAVASCULAR
Status: COMPLETED | OUTPATIENT
Start: 2025-05-09 | End: 2025-05-09

## 2025-05-09 RX ADMIN — KETOROLAC TROMETHAMINE 15 MG: 30 INJECTION, SOLUTION INTRAMUSCULAR at 20:01

## 2025-05-09 RX ADMIN — TAMSULOSIN HYDROCHLORIDE 0.4 MG: 0.4 CAPSULE ORAL at 20:01

## 2025-05-09 RX ADMIN — IOPAMIDOL 80 ML: 755 INJECTION, SOLUTION INTRAVENOUS at 19:19

## 2025-05-09 ASSESSMENT — PAIN DESCRIPTION - LOCATION: LOCATION: PERINEUM

## 2025-05-09 ASSESSMENT — PAIN - FUNCTIONAL ASSESSMENT
PAIN_FUNCTIONAL_ASSESSMENT: 0-10

## 2025-05-09 ASSESSMENT — PAIN SCALES - GENERAL
PAINLEVEL_OUTOF10: 3

## 2025-05-09 ASSESSMENT — PAIN DESCRIPTION - PAIN TYPE: TYPE: ACUTE PAIN

## 2025-05-09 ASSESSMENT — PAIN DESCRIPTION - DESCRIPTORS: DESCRIPTORS: BURNING

## 2025-05-09 NOTE — ED NOTES
Patient and visitor updated on POC. Patient resting in bed. Respirations easy and unlabored. No distress noted. Call light within reach.

## 2025-05-09 NOTE — ED PROVIDER NOTES
disintegrating tablet Take 1 tablet by mouth 3 times daily as needed for Nausea or Vomiting, Disp-21 tablet, R-0Normal             ALLERGIES     is allergic to blueberry flavoring agent (non-screening), augmentin [amoxicillin-pot clavulanate], ciprofloxacin, and mylanta [alum & mag hydroxide-simeth].    FAMILY HISTORY     He indicated that his mother is alive. He indicated that his father is .       SOCIAL HISTORY       Social History     Tobacco Use    Smoking status: Every Day     Current packs/day: 0.25     Types: Cigarettes    Smokeless tobacco: Never   Vaping Use    Vaping status: Never Used   Substance Use Topics    Alcohol use: Yes     Alcohol/week: 1.0 standard drink of alcohol     Types: 1 Cans of beer per week     Comment: couple times per week    Drug use: Yes     Types: Marijuana (Weed)     Comment: occasional       PHYSICAL EXAM       ED Triage Vitals   BP Systolic BP Percentile Diastolic BP Percentile Temp Temp Source Pulse Respirations SpO2   25 1541 -- -- 25 1543 25 1543 25 1541 25 1541 25 1541   (!) 157/105   98.3 °F (36.8 °C) Oral 94 18 100 %      Height Weight - Scale         25 1541 25 1541         1.753 m (5' 9\") 81.6 kg (180 lb)             Physical Exam  Vitals and nursing note reviewed.   Constitutional:       General: He is not in acute distress.     Appearance: He is not ill-appearing or toxic-appearing.   HENT:      Head: Normocephalic and atraumatic.      Right Ear: External ear normal.      Left Ear: External ear normal.      Nose: Nose normal. No congestion.      Mouth/Throat:      Mouth: Mucous membranes are moist.      Pharynx: Oropharynx is clear.   Eyes:      Conjunctiva/sclera: Conjunctivae normal.   Cardiovascular:      Rate and Rhythm: Normal rate and regular rhythm.      Pulses: Normal pulses.      Heart sounds: Normal heart sounds.   Pulmonary:      Effort: Pulmonary effort is normal. No respiratory distress.      Breath  % injection 80 mL (80 mLs IntraVENous Given 5/9/25 1919)   ketorolac (TORADOL) injection 15 mg (15 mg IntraVENous Given 5/9/25 2001)   tamsulosin (FLOMAX) capsule 0.4 mg (0.4 mg Oral Given 5/9/25 2001)         PROCEDURES: (None if blank)  Procedures:     CRITICAL CARE: (None if blank)      DISCHARGE PRESCRIPTIONS: (None if blank)  Discharge Medication List as of 5/9/2025  8:50 PM        START taking these medications    Details   tamsulosin (FLOMAX) 0.4 MG capsule Take 1 capsule by mouth daily, Disp-30 capsule, R-0Normal      ketorolac (TORADOL) 10 MG tablet Take 1 tablet by mouth every 6 hours as needed for Pain, Disp-20 tablet, R-0Normal      sulfamethoxazole-trimethoprim (BACTRIM DS) 800-160 MG per tablet Take 1 tablet by mouth 2 times daily for 7 days, Disp-14 tablet, R-0Normal             FINAL IMPRESSION      1. Ureterolithiasis    2. Hematuria, unspecified type          DISPOSITION/PLAN   DISPOSITION Decision To Discharge 05/09/2025 08:20:29 PM   DISPOSITION CONDITION Stable           OUTPATIENT FOLLOW UP THE PATIENT:  Isai Verma DO  [address] [address]  [phone]    In 2 days  As needed    [facility]  [address] [address]  [phone]    If symptoms worsen    [facility]  [address] [address]  [phone]  In 2 days  As needed      Nate Vazquez DO    This transcription was electronically signed. Parts of this transcriptions may have been dictated by use of voice recognition software and electronically transcribed, and parts may have been transcribed with the assistance of an ED scribe. The transcription may contain errors not detected in proofreading.  Please refer to my supervising physician's documentation if my documentation differs.    Electronically Signed: Nate Vazquez DO, 05/10/25, 12:57 AM

## 2025-05-09 NOTE — ED NOTES
Pt arrives to the ED for hematuria and rectal bleeding. Pt states he has had rectal bleeding for several years however has never been seen. Pt state over the last several months states he has had hematuria that subsided and states the bleeding began a few days ago. Pt states he began having pain and burning with urination and decided to be seen. Pt states he has had \"a lot\" of bleeding. Pt rates his pain a 3/10 in his perineal area. Pt states he took Advil and norco. Pt respirations are unlabored. VSS

## 2025-05-09 NOTE — ED NOTES
Pt. Resting in bed with even and unlabored respirations. Pt. States pain is a 3/10 at this time. Pt to bathroom for urine and stool samples. Pt. Updated about plan of care and treatment. Family at bedside. Pt. Has no further concerns, questions or needs at this time. Call light within reach.

## 2025-05-09 NOTE — ED NOTES
Pt. Resting in bed with even and unlabored respirations. Pt. Resting with eyes closed. Family at bedside. Pt. Has no further concerns, questions or needs at this time. Call light within reach.

## 2025-05-10 NOTE — DISCHARGE INSTRUCTIONS
You were seen here today for blood in your urine and found to have a kidney stone.  Take Flomax and Toradol as needed for pain.  Take Bactrim as prescribed.  Follow-up with the urology office Monday morning at 8:15 AM.

## 2025-05-11 LAB
BACTERIA UR CULT: ABNORMAL
ORGANISM: ABNORMAL

## 2025-05-13 LAB
BACTERIA UR CULT: ABNORMAL
ORGANISM: ABNORMAL